# Patient Record
Sex: MALE | Race: WHITE | Employment: OTHER | URBAN - METROPOLITAN AREA
[De-identification: names, ages, dates, MRNs, and addresses within clinical notes are randomized per-mention and may not be internally consistent; named-entity substitution may affect disease eponyms.]

---

## 2020-10-28 ENCOUNTER — TRANSCRIBE ORDER (OUTPATIENT)
Dept: SCHEDULING | Age: 85
End: 2020-10-28

## 2020-10-28 DIAGNOSIS — I82.503 CHRONIC VENOUS EMBOLISM AND THROMBOSIS OF DEEP VESSELS OF LOWER EXTREMITY, BILATERAL (HCC): Primary | ICD-10-CM

## 2020-11-02 ENCOUNTER — TRANSCRIBE ORDER (OUTPATIENT)
Dept: SCHEDULING | Age: 85
End: 2020-11-02

## 2020-12-02 ENCOUNTER — TRANSCRIBE ORDER (OUTPATIENT)
Dept: SCHEDULING | Age: 85
End: 2020-12-02

## 2020-12-02 DIAGNOSIS — R26.0 STAGGERING GAIT: Primary | ICD-10-CM

## 2020-12-02 DIAGNOSIS — R26.9 IMPAIRED GAIT: Primary | ICD-10-CM

## 2021-04-15 ENCOUNTER — HOSPITAL ENCOUNTER (OUTPATIENT)
Dept: CT IMAGING | Age: 86
Discharge: HOME | End: 2021-04-15
Attending: UROLOGY
Payer: MEDICARE

## 2021-04-15 DIAGNOSIS — C67.9 BLADDER CANCER (HCC): ICD-10-CM

## 2021-04-15 PROCEDURE — 74011000250 HC RX REV CODE- 250

## 2021-04-15 PROCEDURE — 74178 CT ABD&PLV WO CNTR FLWD CNTR: CPT

## 2021-04-15 PROCEDURE — 74011000636 HC RX REV CODE- 636

## 2021-04-15 RX ORDER — BARIUM SULFATE 20 MG/ML
450 SUSPENSION ORAL
Status: COMPLETED | OUTPATIENT
Start: 2021-04-15 | End: 2021-04-15

## 2021-04-15 RX ORDER — BARIUM SULFATE 20 MG/ML
450 SUSPENSION ORAL
Status: DISCONTINUED | OUTPATIENT
Start: 2021-04-15 | End: 2021-04-15

## 2021-04-15 RX ADMIN — IOPAMIDOL 100 ML: 612 INJECTION, SOLUTION INTRAVENOUS at 10:08

## 2021-04-15 RX ADMIN — BARIUM SULFATE 450 ML: 20 SUSPENSION ORAL at 10:08

## 2022-09-08 ENCOUNTER — HOSPITAL ENCOUNTER (OUTPATIENT)
Dept: PHYSICAL THERAPY | Age: 87
Discharge: HOME | End: 2022-09-08
Payer: MEDICARE

## 2022-09-08 PROCEDURE — 97161 PT EVAL LOW COMPLEX 20 MIN: CPT

## 2022-09-08 PROCEDURE — 97140 MANUAL THERAPY 1/> REGIONS: CPT

## 2022-09-08 PROCEDURE — 97110 THERAPEUTIC EXERCISES: CPT

## 2022-09-08 NOTE — PROGRESS NOTES
[facility] Outpatient Rehabilitation  [address], [facility], [address] [facility]:  [phone]  FAX: [phone]    Plan of Care/Statement of Necessity for Physical Therapy Services  2-15    Patient name: Love Bella  : 1935  Provider#: [id number]  Referral source: Maddie Rockwell *      Medical/Treatment Diagnosis: Jaw pain [R68.84]     Prior Hospitalization: see medical history     Comorbidities: HTN  Prior Level of Function: ambulates with cane, drives, active, travels  Medications: Verified on Patient Summary List  Start of Care: 22      Onset Date: 2 weeks ago   The Plan of Care and following information is based on the information from the initial evaluation. Assessment/ key information: Richie Palacios presents with R clicking of the TMJ, insidious onset. No c/o pain with palpation. Able to diminish/ashley the R clicking with manual pressure of the R TMJ. The clicking of the R TMJ may be due to the articular disc moving anterior to the condylar head and then moving back in the proper postion when he closes her mouth. Recommend patient also have a consult with a dentist.  Patient will benefit from TMJ exercises to increase stability and movement of the R TMJ.       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 Patient will report the TMJ clicking has decreased by 50%. Patient will report he is able to diminish the TMJ clicking by manually stabelizing the R TMJ. Long Term Goals: To be accomplished in 16 treatments:   Patient will report he is able to eat without TMJ clicking. Frequency / Duration: Patient to be seen 1-2  times per week for 16 treatments. Patient/ Caregiver education and instruction: exercises    [x]  Plan of care has been reviewed with PTA    The severity rating is based on clinical judgment and the FOTO Score score. Certification Period: 9/8/22-12/8/22  Diane Stauffer, PT 9/8/2022   ________________________________________________________________________    I certify that the above Therapy Services are being furnished while the patient is under my care. I agree with the treatment plan and certify that this therapy is necessary. [de-identified] Signature:____________________  Date:____________Time: _________           Soledad Diss *    Please sign and return to: [facility] Outpatient Rehabilitation  [address], [place], [address] [facility]:  [address] [place] [address] Street: [phone]

## 2022-09-08 NOTE — PROGRESS NOTES
PT INITIAL EVALUATION NOTE - [facility] 2-15    Patient Name: Gabbie Larkin  Date:2022  : 1935  [x]  Patient  Verified  Payor: [employer]ren[employer] / Plan: [place] / Product Type: Medicare /    In time:1045  Out time:1130  Total Treatment Time (min): 45  Total Timed Codes (min): 45  1:1 Treatment Time ( [address] only): 45   Visit #: 1     Treatment Area: Jaw pain [R68.84]    SUBJECTIVE  Pain Level (0-10 scale): 0  Any medication changes, allergies to medications, adverse drug reactions, diagnosis change, or new procedure performed?: [] No    [x] Yes (see summary sheet for update)  Subjective:    Patient reports insidious onset of TMJ clicking, clicking while eating, reports annoying while eating,clicks every bite  Pt Goals: stop the TMJ clicking        OBJECTIVE/EXAMINATION  Posture:  forward head posture  Other Observations:    Gait and Functional Mobility:  pt ambulates independently with a straight cane  Palpation: no tenderness, audible and able to palpate click of R TMJ when opening the mouth full and returning to closed position. Bottom jaw deviation to R- mild click. Bottom jaw deviation to L- moderate click.   Cervical AROM WFL, does not reproduce clicking  TMJ AROM WFL- audible click with full opening of jaw, when jaw opens 1/2 way, no clicking noted  MMT: [facility]  Neurological: Reflexes / Sensations: intact      10 min Therapeutic Exercise:  [] See flow sheet :instructed in upright sitting and standing posture, scapular retraction, being aware of upright posture when chewing/eating   Rationale:  decrease clicking  to improve the patients ability to eat        15 min Manual Therapy: MFR occipitals, SCM, TMJ palpation and stabelization with opening and closing the mouth, able to ashley the click on R TMJ with manual stabelization    Rationale:  decrease clicking  to improve the patients ability to eat              With   [] TE   [] TA   [] neuro   [] other: Patient Education: [x] Review HEP    [] Progressed/Changed HEP based on:   [] positioning   [] body mechanics   [] transfers   [] heat/ice application    [] other:        Pain Level (0-10 scale) post treatment: 0    ASSESSMENT/Changes in Function:     [x]  See Plan of [address], PT 9/8/2022

## 2022-09-14 ENCOUNTER — HOSPITAL ENCOUNTER (OUTPATIENT)
Dept: PHYSICAL THERAPY | Age: 87
Discharge: HOME | End: 2022-09-14
Payer: MEDICARE

## 2022-09-14 PROCEDURE — 97110 THERAPEUTIC EXERCISES: CPT

## 2022-09-14 PROCEDURE — 97140 MANUAL THERAPY 1/> REGIONS: CPT

## 2022-09-14 NOTE — PROGRESS NOTES
PT DAILY TREATMENT NOTE - [facility]     Patient Name: Yosef Redmond  Date:2022  : 1935  [x]  Patient  Verified  Payor: Dee Rios / Plan: [place] MEDICARE PART A & B / Product Type: Medicare /    In time:2  Out time:245  Total Treatment Time (min): 45  Total Timed Codes (min): 45  1:1 Treatment Time ([facility] only): 39   Visit #: 2 of 16    Treatment Area: Jaw pain [R68.84]    SUBJECTIVE  Pain Level (0-10 scale): 0  Any medication changes, allergies to medications, adverse drug reactions, diagnosis change, or new procedure performed?: [x] No    [] Yes (see summary sheet for update)  Subjective functional status/changes:   [] No changes reported  Jaw was clicking less after the last visit    OBJECTIVE    15 min Therapeutic Exercise:  [x] See flow sheet :Patient instructed in and performed seated TMJ opening with unilateral facilitation of the finger to diminish the clicking/ popping of R TMJ, patient also performed  and educated in TMJ disc recapture exercise   Rationale:  decrease TMJ clicking and popping  to improve the patients ability to eat       30 min Manual Therapy:  MFR, occipital release , SCM, cervical paraspinals, upper traps, gentle MFR over mandible, gentle overpressure R TMJ when opening and closing mouth   Rationale:  decrease clicking   to improve ability of patient to eat       With   [] TE   [] TA   [] neuro   [] other: Patient Education: [x] Review HEP    [] Progressed/Changed HEP based on:   [] positioning   [] body mechanics   [] transfers   [] heat/ice application    [] other:         Pain Level (0-10 scale) post treatment: 0    ASSESSMENT/Changes in Function: clicking diminished after treatment and patient has been instructed in TMJ stabelization technique which abated the clicking during therapy    Patient will continue to benefit from skilled PT services to modify and progress therapeutic interventions and address functional mobility deficits to attain remaining goals.      [x] See Plan of Care  []  See progress note/recertification  []  See Discharge Summary         Progress towards goals / Updated goals:  Progressing towards goals    PLAN  []  Upgrade activities as tolerated     []  Continue plan of care  []  Update interventions per flow sheet       []  Discharge due to:_  [x]  Other:_ Patient will be going out of town and will contact PT if he needs to return.      Olena Litten, PT 9/14/2022

## 2022-10-20 NOTE — PROGRESS NOTES
PhysicalTherapy Discharge Summary    Patient name: Davina Tapia Start of Care: 2022   Referral source: Paty Treadwell * : 1935   Medical/Treatment Diagnosis: Jaw pain [R68.84] Onset Date:refer to eval     Prior Hospitalization: see medical history Provider#: Medications: Verified on Patient Summary List    Comorbidities: refer to eval  Prior Level of Function:refer to eval  Visits from Start of Care: 2    Missed Visits: 0  Reporting Period : 2022 to 2022    Summary of [facility] reported feeling better after the second treatment and reported he did not need to come in anymore. He reported less jaw clicking. Unknown if all goals met. Patient went out of town and has not contacted PT to return. Patient progressing towards goals  Short Term Goals: To be accomplished in 8 weeks:   Patient will be independent in HEP. Patient will report the TMJ clicking has decreased by 50%. Patient will report he is able to diminish the TMJ clicking by manually stabelizing the R TMJ. Long Term Goals: To be accomplished in 16 treatments:   Patient will report he is able to eat without TMJ clicking. The severity rating is based on clinical judgment and the FOTO score.     ASSESSMENT/RECOMMENDATIONS:  [x]Discontinue therapy: [x]Patient has reached or is progressing toward set goals      []Patient is non-compliant or has abdicated      []Due to lack of appreciable progress towards set [facility], PT 10/20/2022

## 2023-01-23 ENCOUNTER — HOSPITAL ENCOUNTER (OUTPATIENT)
Dept: PHYSICAL THERAPY | Age: 88
Discharge: HOME | End: 2023-01-23
Payer: MEDICARE

## 2023-01-23 PROCEDURE — 97161 PT EVAL LOW COMPLEX 20 MIN: CPT

## 2023-01-23 PROCEDURE — 97035 APP MDLTY 1+ULTRASOUND EA 15: CPT

## 2023-01-23 PROCEDURE — 97110 THERAPEUTIC EXERCISES: CPT

## 2023-01-23 PROCEDURE — 97140 MANUAL THERAPY 1/> REGIONS: CPT

## 2023-01-23 NOTE — PROGRESS NOTES
[facility] Outpatient Rehabilitation  [address], [place], [address] [facility]:  [phone]  FAX: [phone]    Plan of Care/Statement of Necessity for Physical Therapy Services  2-15    Patient name: Yamilet Ford  : 1935  Provider#: [id number]  Referral source: Ganga Mcghee *      Medical/Treatment Diagnosis: Pain in right hip [M25.551]     Prior Hospitalization: see medical history     Comorbidities: HTN  Prior Level of Function: ambulates with a straight cane or a rollator  Medications: Verified on Patient Summary List  Start of Care: 2023      Onset Date: months   The Plan of Care and following information is based on the information from the initial evaluation. Assessment/ key information: Alie Lamb presents with intermittent pain R piriformis region and will benefit from LE flexibility exercises, LE and trunk strengthening, MFR, postural exercises and modalities as indicated.     Evaluation Complexity History MEDIUM  Complexity : 1-2 comorbidities / personal factors will impact the outcome/ POC ; Examination MEDIUM Complexity : 3 Standardized tests and measures addressing body structure, function, activity limitation and / or participation in recreation  ;Presentation LOW Complexity : Stable, uncomplicated  ;Clinical Decision Making MEDIUM Complexity : FOTO score of 26-74  Overall Complexity Rating: LOW     Problem List: pain affecting function, decrease ROM, and decrease strength   Treatment Plan may include any combination of the following: Therapeutic exercise, Neuromuscular reeducation, Manual therapy, Therapeutic activity, Electric stim unattended , and Gait training ultrasound  Patient / Family readiness to learn indicated by: asking questions, trying to perform skills, and interest  Persons(s) to be included in education: patient (P)  Barriers to Learning/Limitations: None  Patient Goal (s): to stop the pain, continue to travel, continue to exercise with  2x/week  Patient Self Reported Health Status: good  Rehabilitation Potential: good    Short Term Goals: To be accomplished in 8 treatments:   Patient will be independent in a HEP to manage his symptoms. Patient will report his worst pain with activities has decreased to a 3/10. Patient will report he is able to perform bed mobility and sit to stand transfers without pain. Long Term Goals: To be accomplished in 16 treatments:   Patient will report he is able to resume all activities without difficulty and without pain. Frequency / Duration: Patient to be seen 1-2 times per week for 16 treatments. Patient/ Caregiver education and instruction: exercises    [x]  Plan of care has been reviewed with PTA    The severity rating is based on clinical judgment and the FOTO Score score. Certification Period: 1/23/2023-4/23/2023  Marlon Martinez, PT 1/23/2023   ________________________________________________________________________    I certify that the above Therapy Services are being furnished while the patient is under my care. I agree with the treatment plan and certify that this therapy is necessary. [de-identified] Signature:____________________  Date:____________Time: _________           Eric Jacobsen *    Please sign and return to: [facility] Outpatient Rehabilitation  [address], [place], [address] [facility]:  [address] [place] [address] Street: [phone]

## 2023-01-23 NOTE — PROGRESS NOTES
PT INITIAL EVALUATION NOTE - [facility] 2-15    Patient Name: Preeti Singh  Date:2023  : 1935  [x]  Patient  Verified  Payor: Jess Zain / Plan: [place] MEDICARE PART A & B / Product Type: Medicare /    In time:2  Out time:3  Total Treatment Time (min): 60  Total Timed Codes (min): 60  1:1 Treatment Time ([facility] only): 60   Visit #: 1     Treatment Area: Pain in right hip [M25.551]    SUBJECTIVE  Pain Level (0-10 scale): 5  Any medication changes, allergies to medications, adverse drug reactions, diagnosis change, or new procedure performed?: [] No    [x] Yes (see summary sheet for update)  Subjective:    Pain R posterior hip, intermittent when standing or move a certain way.   Exercises with  2x/week does weights  PLOF: ambulates with a 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 Cane, knees flexed, unsteady at time  Palpation: moderate tenderness R piriformis  Lumbar AROM WFL, decreased flexibility of bilateral hamstrings and hip external rotators  MMT: LE MMT grossly 4/5  Neurological: Reflexes / Sensations: intact to light touch bilateral LEs      Modality rationale: decrease inflammation, decrease pain, and increase tissue extensibility to improve the patients ability to perform functional activities   Min Type Additional Details    [] Estim: []Att   []Unatt        []TENS instruct                  []IFC  []Premod   []NMES                     []Other:  []w/US   []w/ice   []w/heat  Position:  Location:    []  Traction: [] Cervical       []Lumbar                       [] Prone          []Supine                       []Intermittent   []Continuous Lbs:  [] before manual  [] after manual  []w/heat   8 [x]  Ultrasound: []Continuous   [x] Pulsed at:                           [x]1MHz   []3MHz Location:R posterior hip, piriformis  W/cm2:1.2    [] Paraffin         Location:   []w/heat    []  Ice     []  Heat  []  Ice massage Position:  Location:    []  Laser  []  Other: Position:  Location:      []  Vasopneumatic Device Pressure:       [] lo [] med [] hi   Temperature:      [x] Skin assessment post-treatment:  [x]intact []redness- no adverse reaction    []redness - adverse reaction:     10 min Therapeutic Exercise:  [x] See flow sheet :   Rationale: increase ROM and increase strength to improve the patients ability to perform functional activities    8 min Manual Therapy: MFR piriformis/lumbar /sacral, trigger point release, patient in L sidelying    Rationale: decrease pain, increase ROM, increase tissue extensibility, and decrease trigger points to improve the patients ability to perform activities without pain              With   [] TE   [] TA   [] neuro   [] other: Patient Education: [x] Review HEP    [] Progressed/Changed HEP based on:   [] positioning   [] body mechanics   [] transfers   [] heat/ice application    [] other:      Other Objective/Functional Measures:FOTO    Pain Level (0-10 scale) post treatment: \"feels much better\"    ASSESSMENT/Changes in Function:     [x]  See Plan of [address], PT 1/23/2023

## 2023-01-25 ENCOUNTER — HOSPITAL ENCOUNTER (OUTPATIENT)
Dept: PHYSICAL THERAPY | Age: 88
Discharge: HOME | End: 2023-01-25
Payer: MEDICARE

## 2023-01-25 PROCEDURE — 97035 APP MDLTY 1+ULTRASOUND EA 15: CPT

## 2023-01-25 PROCEDURE — 97110 THERAPEUTIC EXERCISES: CPT

## 2023-01-25 PROCEDURE — 97140 MANUAL THERAPY 1/> REGIONS: CPT

## 2023-01-25 NOTE — PROGRESS NOTES
PT DAILY TREATMENT NOTE - [facility]     Patient Name: Preeti Singh  Date:2023  : 1935  [x]  Patient  Verified  Payor: [place] MEDICARE / Plan: [place] MEDICARE PART A & B / Product Type: Medicare /    In time:245  Out time:325  Total Treatment Time (min): 40  Total Timed Codes (min): 40  1:1 Treatment Time ([facility] only): 40   Visit #: 2 of 16    Treatment Area: Pain in right hip [M25.551]    SUBJECTIVE  Pain Level (0-10 scale): intermittent 5  Any medication changes, allergies to medications, adverse drug reactions, diagnosis change, or new procedure performed?: [x] No    [] Yes (see summary sheet for update)  Subjective functional status/changes:   [] No changes reported  Patient is sore today, exercised yesterday    OBJECTIVE            Modality rationale: decrease inflammation, decrease pain, and increase tissue extensibility to improve the patients ability to perform functional activities   Min Type Additional Details      [] Estim: []Att   []Unatt        []TENS instruct                  []IFC  []Premod   []NMES                     []Other:  []w/US   []w/ice   []w/heat  Position:  Location:      []  Traction: [] Cervical       []Lumbar                       [] Prone          []Supine                       []Intermittent   []Continuous Lbs:  [] before manual  [] after manual  []w/heat    8 [x]  Ultrasound: []Continuous   [x] Pulsed at:                           [x]1MHz   []3MHz Location:R posterior hip, piriformis  W/cm2:1.2      [] Paraffin         Location:   []w/heat      []  Ice     []  Heat  []  Ice massage Position:  Location:      []  Laser  []  Other: Position:  Location:         []  Vasopneumatic Device Pressure:       [] lo [] med [] hi   Temperature:        [x] Skin assessment post-treatment:  [x]intact []redness- no adverse reaction    []redness - adverse reaction:      20 min Therapeutic Exercise:  [x] See flow sheet :   Rationale: increase ROM and increase strength to improve the patients ability to perform functional activities     10 min Manual Therapy: MFR piriformis/lumbar /sacral, trigger point release, patient in L sidelying    Rationale: decrease pain, increase ROM, increase tissue extensibility, and decrease trigger points to improve the patients ability to perform activities without pain       With   [] TE   [] TA   [] neuro   [] other: Patient Education: [x] Review HEP    [] Progressed/Changed HEP based on:   [] positioning   [] body mechanics   [] transfers   [] heat/ice application    [] other:           Pain Level (0-10 scale) post treatment: 0-5    ASSESSMENT/Changes in Function: Patient continues to progress towards goals, decreased pain after therapy    Patient will continue to benefit from skilled PT services to modify and progress therapeutic interventions and address functional mobility deficits to attain remaining goals.      [x]  See Plan of Care  []  See progress note/recertification  []  See Discharge Summary         Progress towards goals / Updated goals:  Progressing towards goals    PLAN  [x]  Upgrade activities as tolerated     [x]  Continue plan of care  [x]  Update interventions per flow sheet       []  Discharge due to:_  []  Other:_      Gregory Khan, PT 1/25/2023

## 2023-02-01 ENCOUNTER — HOSPITAL ENCOUNTER (OUTPATIENT)
Dept: PHYSICAL THERAPY | Age: 88
Discharge: HOME | End: 2023-02-01
Payer: MEDICARE

## 2023-02-01 PROCEDURE — 97110 THERAPEUTIC EXERCISES: CPT

## 2023-02-01 PROCEDURE — 97035 APP MDLTY 1+ULTRASOUND EA 15: CPT

## 2023-02-01 PROCEDURE — 97140 MANUAL THERAPY 1/> REGIONS: CPT

## 2023-02-01 NOTE — PROGRESS NOTES
PT DAILY TREATMENT NOTE - [facility]     Patient Name: Valery Navarro  Date:2023  : 1935  [x]  Patient  Verified  Payor: John Morrison / Plan: [place] MEDICARE PART A & B / Product Type: Medicare /    In time:210  Out time:3  Total Treatment Time (min): 40  Total Timed Codes (min): 40  1:1 Treatment Time ( [address] only): 40   Visit #: 3 of 16    Treatment Area: Pain in right hip [M25.551]    SUBJECTIVE  Pain Level (0-10 scale): intermittent ache  Any medication changes, allergies to medications, adverse drug reactions, diagnosis change, or new procedure performed?: [x] No    [] Yes (see summary sheet for update)  Subjective functional status/changes:   [] No changes reported  Patient thinks his pain may be decreasing, he exercised with a  today    OBJECTIVE              Modality rationale: decrease inflammation, decrease pain, and increase tissue extensibility to improve the patients ability to perform functional activities   Min Type Additional Details       [] Estim: []Att   []Unatt        []TENS instruct                  []IFC  []Premod   []NMES                     []Other:  []w/US   []w/ice   []w/heat  Position:  Location:       []  Traction: [] Cervical       []Lumbar                       [] Prone          []Supine                       []Intermittent   []Continuous Lbs:  [] before manual  [] after manual  []w/heat     8 [x]  Ultrasound: []Continuous   [x] Pulsed at:                           [x]1MHz   []3MHz Location:R posterior hip, piriformis  W/cm2:1.2       [] Paraffin         Location:   []w/heat       []  Ice     []  Heat  []  Ice massage Position:  Location:       []  Laser  []  Other: Position:  Location:          []  Vasopneumatic Device Pressure:       [] lo [] med [] hi   Temperature:         [x] Skin assessment post-treatment:  [x]intact []redness- no adverse reaction    []redness - adverse reaction:      15 min Therapeutic Exercise:  [x] See flow sheet :   Rationale: increase ROM and increase strength to improve the patients ability to perform functional activities     15 min Manual Therapy: MFR piriformis/lumbar /sacral, trigger point release, patient in L sidelying    Rationale: decrease pain, increase ROM, increase tissue extensibility, and decrease trigger points to improve the patients ability to perform activities without pain  With   [] TE   [] TA   [] neuro   [] other: Patient Education: [x] Review HEP    [] Progressed/Changed HEP based on:   [] positioning   [] body mechanics   [] transfers   [] heat/ice application    [] other:         Pain Level (0-10 scale) post treatment: \"better\"    ASSESSMENT/Changes in Function: pain is decreasing, patient is working towards goals    Patient will continue to benefit from skilled PT services to modify and progress therapeutic interventions and address functional mobility deficits to attain remaining goals.      [x]  See Plan of Care  []  See progress note/recertification  []  See Discharge Summary             PLAN  []  Upgrade activities as tolerated     [x]  Continue plan of care  []  Update interventions per flow sheet       []  Discharge due to:_  []  Other:_      Leyla Londono, PT 2/1/2023

## 2023-02-03 ENCOUNTER — HOSPITAL ENCOUNTER (OUTPATIENT)
Dept: ULTRASOUND IMAGING | Age: 88
Discharge: HOME | End: 2023-02-03
Attending: INTERNAL MEDICINE
Payer: MEDICARE

## 2023-02-03 ENCOUNTER — TRANSCRIBE ORDER (OUTPATIENT)
Dept: SCHEDULING | Age: 88
End: 2023-02-03

## 2023-02-03 DIAGNOSIS — M79.661 BILATERAL CALF PAIN: ICD-10-CM

## 2023-02-03 DIAGNOSIS — M79.661 BILATERAL CALF PAIN: Primary | ICD-10-CM

## 2023-02-03 DIAGNOSIS — M79.662 BILATERAL CALF PAIN: ICD-10-CM

## 2023-02-03 DIAGNOSIS — M79.662 BILATERAL CALF PAIN: Primary | ICD-10-CM

## 2023-02-03 PROCEDURE — 93971 EXTREMITY STUDY: CPT

## 2023-02-06 ENCOUNTER — HOSPITAL ENCOUNTER (OUTPATIENT)
Dept: PHYSICAL THERAPY | Age: 88
Discharge: HOME | End: 2023-02-06
Payer: MEDICARE

## 2023-02-06 PROCEDURE — 97140 MANUAL THERAPY 1/> REGIONS: CPT

## 2023-02-06 PROCEDURE — 97110 THERAPEUTIC EXERCISES: CPT

## 2023-02-06 PROCEDURE — 97035 APP MDLTY 1+ULTRASOUND EA 15: CPT

## 2023-02-06 NOTE — PROGRESS NOTES
PhysicalTherapy Discharge Summary    Patient name: Yenifer Camacho Start of Care: 2023   Referral source: Anahi Krishnamurthy * : 1935   Medical/Treatment Diagnosis: Pain in right hip [M25.551] Onset Date:refer to eval     Prior Hospitalization: see medical history Provider#: Medications: Verified on Patient Summary List    Comorbidities: refer to eval  Prior Level of Function:refer to eval  Visits from Start of Care: 4     Reporting Period : 2023 to 2023    Summary of Care: Patient reports he is feeling much better and feels ready to stop Physical therapy. He has been instructed in a HEP and meets with a  2x/week to exercise. Goals met. Short Term Goals: To be accomplished in 8 treatments:MET   Patient will be independent in a HEP to manage his symptoms. Patient will report his worst pain with activities has decreased to a 3/10. Patient will report he is able to perform bed mobility and sit to stand transfers without pain. Long Term Goals: To be accomplished in 16 treatments:MET   Patient will report he is able to resume all activities without difficulty and without pain. The severity rating is based on clinical judgment and the FOTO score.     ASSESSMENT/RECOMMENDATIONS:  [x]Discontinue therapy: [x]Patient has reached or is progressing toward set goals      []Patient is non-compliant or has abdicated      []Due to lack of appreciable progress towards set goals    Anita Hodges, PT 2023

## 2023-02-06 NOTE — PROGRESS NOTES
PT DAILY TREATMENT NOTE - [facility]     Patient Name: Lina Velázquez  Date:2023  : 1935  [x]  Patient  Verified  Payor: [place] MED[place]ARE / Plan: [place] MEDICARE PART A & B / Product Type: Medicare /    In time:10  Out time:1040  Total Treatment Time (min): 40  Total Timed Codes (min): 40  1:1 Treatment Time ( only): 40   Visit #: 4 of 16    Treatment Area: Pain in right hip [M25.551]    SUBJECTIVE  Pain Level (0-10 scale): 0  Any medication changes, allergies to medications, adverse drug reactions, diagnosis change, or new procedure performed?: [x] No    [] Yes (see summary sheet for update)  Subjective functional status/changes:   [] No changes reported  Patient reports he is feeling much better and today can be his last visit    OBJECTIVE                 Modality rationale: decrease inflammation, decrease pain, and increase tissue extensibility to improve the patients ability to perform functional activities   Min Type Additional Details       [] Estim: []Att   []Unatt        []TENS instruct                  []IFC  []Premod   []NMES                     []Other:  []w/US   []w/ice   []w/heat  Position:  Location:       []  Traction: [] Cervical       []Lumbar                       [] Prone          []Supine                       []Intermittent   []Continuous Lbs:  [] before manual  [] after manual  []w/heat     8 [x]  Ultrasound: []Continuous   [x] Pulsed at:                           [x]1MHz   []3MHz Location:R posterior hip, piriformis  W/cm2:1.2       [] Paraffin         Location:   []w/heat       []  Ice     []  Heat  []  Ice massage Position:  Location:       []  Laser  []  Other: Position:  Location:          []  Vasopneumatic Device Pressure:       [] lo [] med [] hi   Temperature:         [x] Skin assessment post-treatment:  [x]intact []redness- no adverse reaction    []redness - adverse reaction:      15 min Therapeutic Exercise:  [x] See flow sheet :review HEP, exercises, positioning Rationale: increase ROM and increase strength to improve the patients ability to perform functional activities     15 min Manual Therapy: MFR piriformis/lumbar /sacral, trigger point release, patient in L sidelying    Rationale: decrease pain, increase ROM, increase tissue extensibility, and decrease trigger points to improve the patients ability to perform activities without pain              With   [] TE   [] TA   [] neuro   [] other: Patient Education: [x] Review HEP    [] Progressed/Changed HEP based on:   [] positioning   [] body mechanics   [] transfers   [] heat/ice application    [] other:         Pain Level (0-10 scale) post treatment: 0    ASSESSMENT/Changes in Function: goals met, D/C PT    []  See Plan of Care  []  See progress note/recertification  [x]  See Discharge Summary             PLAN  []  Upgrade activities as tolerated     []  Continue plan of care  []  Update interventions per flow sheet       [x]  Discharge due to:_Goals met  []  Other:_      Evelia Hennessy, PT 2/6/2023

## 2023-03-28 ENCOUNTER — HOSPITAL ENCOUNTER (OUTPATIENT)
Dept: LAB | Age: 88
Discharge: HOME | End: 2023-03-28
Payer: MEDICARE

## 2023-03-28 LAB
APPEARANCE UR: CLEAR
BILIRUB UR QL: NEGATIVE
COLOR UR: ABNORMAL
GLUCOSE UR STRIP.AUTO-MCNC: NEGATIVE MG/DL
HGB UR QL STRIP: NEGATIVE
KETONES UR QL STRIP.AUTO: ABNORMAL MG/DL
LEUKOCYTE ESTERASE UR QL STRIP.AUTO: NEGATIVE
NITRITE UR QL STRIP.AUTO: NEGATIVE
PH UR STRIP: 6 (ref 5–8)
PROT UR STRIP-MCNC: NEGATIVE MG/DL
SP GR UR REFRACTOMETRY: 1.02 (ref 1–1.03)
UROBILINOGEN UR QL STRIP.AUTO: 1 EU/DL (ref 0.2–1)

## 2023-03-28 PROCEDURE — 87086 URINE CULTURE/COLONY COUNT: CPT

## 2023-03-28 PROCEDURE — 81003 URINALYSIS AUTO W/O SCOPE: CPT

## 2023-03-29 LAB
BACTERIA SPEC CULT: NORMAL
SERVICE CMNT-IMP: NORMAL

## 2023-04-19 ENCOUNTER — HOSPITAL ENCOUNTER (OUTPATIENT)
Dept: PHYSICAL THERAPY | Age: 88
Discharge: HOME | End: 2023-04-19
Payer: MEDICARE

## 2023-04-19 PROCEDURE — 97110 THERAPEUTIC EXERCISES: CPT

## 2023-04-19 PROCEDURE — 97140 MANUAL THERAPY 1/> REGIONS: CPT

## 2023-04-19 NOTE — PROGRESS NOTES
PT DAILY TREATMENT NOTE - [facility]     Patient Name: Susan Ly  Date:2023  : 1935  [x]  Patient  Verified  Payor: Ric Weller / Plan: [place] MEDICARE PART A & B / Product Type: Medicare /    In time:1130  Out time:1205  Total Treatment Time (min): 35  Total Timed Codes (min): 35  1:1 Treatment Time ( [address] only): 35   Visit #: 2 of 16    Treatment Area: Other abnormalities of gait and mobility [R26.89]    SUBJECTIVE  Pain Level (0-10 scale): 0  Any medication changes, allergies to medications, adverse drug reactions, diagnosis change, or new procedure performed?: [x] No    [] Yes (see summary sheet for update)  Subjective functional status/changes:   [] No changes reported  Patient reports his neck is feeling a lot better and he does not have to return for balance exercises after today because he exercises with a  2x/week at [employer]. OBJECTIVE      20 min Therapeutic Exercise:  [x] See flow sheet :   Rationale: increase strength and improve balance to improve the patients ability to perform functional activities      10 min Manual Therapy:  MFR R cervical, upper trap   Rationale: decrease pain to improve functional mobility              With   [] TE   [] TA   [] neuro   [] other: Patient Education: [x] Review HEP    [] Progressed/Changed HEP based on:   [] positioning   [] body mechanics   [] transfers   [] heat/ice application    [] other:           Pain Level (0-10 scale) post treatment: 0    ASSESSMENT/Changes in Function: This is Marcio's 2nd visit and goals not achieved due to time frame. However, he does not think he needs to continues therapy because he exercises with a  2 days/week. The benefits of balance training was explained to the patient.       []  See Plan of Care  []  See progress note/recertification  [x]  See Discharge Summary             PLAN  []  Upgrade activities as tolerated     []  Continue plan of care  []  Update interventions per flow sheet       [x] Discharge due to:_patient request  []  Other:_      Mitchell Hernandez, PT 4/19/2023

## 2023-04-20 NOTE — PROGRESS NOTES
PhysicalTherapy Discharge Summary    Patient name: Robin Bernard Start of Care: 2023   Referral source: Sandy Stein MD : 1935   Medical/Treatment Diagnosis: Other abnormalities of gait and mobility [R26.89] Onset Date:refer to eval     Prior Hospitalization: see medical history Provider#: Medications: Verified on Patient Summary List    Comorbidities: refer to eval  Prior Level of Function:refer to eval  Visits from Start of Care: 2     Reporting Period : 2023 to 2023    Summary of Care: Patient reports that his neck feels better. He states that today can be his last visit because he works out with a  2x/week. The benefits of balance training was explained to the patient. Goals not met due to timeframe of visits. Short Term Goals: To be accomplished in 8 treatments:   Patient will be independent in a HEP to increase his safety with mobility. Mcgarry balance score will increase to a 18/56 to indicate improved balance with mobility. .  Patient will report that he is able to perform his daily activities without cervical pain. Long Term Goals: To be accomplished in 16 treatments:   Patient will increase Mcgarry balance score to a 24/56 to indicate improved balance  with mobility. Patient will report he has resumed all activities without cervical pain or challenges with cervical mobility. The severity rating is based on clinical judgment and the FOTO score.     ASSESSMENT/RECOMMENDATIONS:  [x]Discontinue therapy: []Patient has reached or is progressing toward set goals      [x]Patient request      []Due to lack of appreciable progress towards set goals    Matilde Wilson, PT 2023

## 2023-04-24 ENCOUNTER — APPOINTMENT (OUTPATIENT)
Dept: PHYSICAL THERAPY | Age: 88
End: 2023-04-24
Payer: MEDICARE

## 2023-05-01 ENCOUNTER — APPOINTMENT (OUTPATIENT)
Dept: PHYSICAL THERAPY | Age: 88
End: 2023-05-01

## 2023-05-16 ENCOUNTER — TRANSCRIBE ORDERS (OUTPATIENT)
Facility: HOSPITAL | Age: 88
End: 2023-05-16

## 2023-05-16 DIAGNOSIS — S70.02XA CONTUSION OF LEFT HIP, INITIAL ENCOUNTER: Primary | ICD-10-CM

## 2023-05-19 ENCOUNTER — HOSPITAL ENCOUNTER (OUTPATIENT)
Facility: HOSPITAL | Age: 88
Discharge: HOME | End: 2023-05-19
Payer: MEDICARE

## 2023-05-19 DIAGNOSIS — S70.02XA CONTUSION OF LEFT HIP, INITIAL ENCOUNTER: ICD-10-CM

## 2023-05-19 PROCEDURE — 76882 US LMTD JT/FCL EVL NVASC XTR: CPT

## 2023-07-16 ENCOUNTER — HOSPITAL ENCOUNTER (EMERGENCY)
Facility: HOSPITAL | Age: 88
Discharge: HOME | End: 2023-07-16
Attending: EMERGENCY MEDICINE
Payer: MEDICARE

## 2023-07-16 VITALS
BODY MASS INDEX: 25.62 KG/M2 | OXYGEN SATURATION: 97 % | TEMPERATURE: 97.7 F | HEIGHT: 71 IN | HEART RATE: 52 BPM | RESPIRATION RATE: 16 BRPM | SYSTOLIC BLOOD PRESSURE: 166 MMHG | WEIGHT: 183 LBS | DIASTOLIC BLOOD PRESSURE: 77 MMHG

## 2023-07-16 DIAGNOSIS — M79.604 RIGHT LEG PAIN: Primary | ICD-10-CM

## 2023-07-16 PROCEDURE — 99282 EMERGENCY DEPT VISIT SF MDM: CPT

## 2023-07-16 ASSESSMENT — LIFESTYLE VARIABLES
HOW MANY STANDARD DRINKS CONTAINING ALCOHOL DO YOU HAVE ON A TYPICAL DAY: PATIENT DOES NOT DRINK
HOW OFTEN DO YOU HAVE A DRINK CONTAINING ALCOHOL: NEVER
HOW OFTEN DO YOU HAVE A DRINK CONTAINING ALCOHOL: MONTHLY OR LESS

## 2023-07-16 ASSESSMENT — ENCOUNTER SYMPTOMS
SHORTNESS OF BREATH: 0
NAUSEA: 0
ABDOMINAL PAIN: 0
DIARRHEA: 0
SORE THROAT: 0
COUGH: 0
EYE REDNESS: 0
VOMITING: 0

## 2023-07-16 ASSESSMENT — PAIN - FUNCTIONAL ASSESSMENT
PAIN_FUNCTIONAL_ASSESSMENT: PREVENTS OR INTERFERES SOME ACTIVE ACTIVITIES AND ADLS
PAIN_FUNCTIONAL_ASSESSMENT: 0-10

## 2023-07-16 ASSESSMENT — PAIN SCALES - GENERAL: PAINLEVEL_OUTOF10: 6

## 2023-07-16 ASSESSMENT — PAIN DESCRIPTION - DESCRIPTORS: DESCRIPTORS: ACHING

## 2023-07-16 ASSESSMENT — PAIN DESCRIPTION - ORIENTATION: ORIENTATION: RIGHT

## 2023-07-17 ENCOUNTER — TRANSCRIBE ORDERS (OUTPATIENT)
Facility: HOSPITAL | Age: 88
End: 2023-07-17

## 2023-07-17 ENCOUNTER — HOSPITAL ENCOUNTER (OUTPATIENT)
Facility: HOSPITAL | Age: 88
Discharge: HOME | End: 2023-07-20
Attending: EMERGENCY MEDICINE
Payer: MEDICARE

## 2023-07-17 DIAGNOSIS — M79.661 RIGHT CALF PAIN: ICD-10-CM

## 2023-07-17 DIAGNOSIS — M79.661 RIGHT CALF PAIN: Primary | ICD-10-CM

## 2023-07-17 PROCEDURE — 93971 EXTREMITY STUDY: CPT

## 2023-07-20 ENCOUNTER — TRANSCRIBE ORDERS (OUTPATIENT)
Facility: HOSPITAL | Age: 88
End: 2023-07-20

## 2023-07-20 DIAGNOSIS — M54.16 LUMBAR RADICULOPATHY: Primary | ICD-10-CM

## 2023-07-24 ENCOUNTER — HOSPITAL ENCOUNTER (OUTPATIENT)
Facility: HOSPITAL | Age: 88
Discharge: HOME | End: 2023-07-27
Attending: INTERNAL MEDICINE
Payer: MEDICARE

## 2023-07-24 DIAGNOSIS — M54.16 LUMBAR RADICULOPATHY: ICD-10-CM

## 2023-07-24 PROCEDURE — 6360000004 HC RX CONTRAST MEDICATION: Performed by: INTERNAL MEDICINE

## 2023-07-24 PROCEDURE — 72158 MRI LUMBAR SPINE W/O & W/DYE: CPT

## 2023-07-24 PROCEDURE — A9579 GAD-BASE MR CONTRAST NOS,1ML: HCPCS | Performed by: INTERNAL MEDICINE

## 2023-07-24 RX ADMIN — GADOTERIDOL 18 ML: 279.3 INJECTION, SOLUTION INTRAVENOUS at 12:18

## 2023-07-27 ENCOUNTER — TRANSCRIBE ORDERS (OUTPATIENT)
Facility: HOSPITAL | Age: 88
End: 2023-07-27

## 2023-07-27 DIAGNOSIS — M54.10 RADICULOPATHY, UNSPECIFIED SPINAL REGION: Primary | ICD-10-CM

## 2023-07-31 ENCOUNTER — HOSPITAL ENCOUNTER (OUTPATIENT)
Facility: HOSPITAL | Age: 88
Discharge: HOME | End: 2023-08-03
Attending: INTERNAL MEDICINE
Payer: MEDICARE

## 2023-07-31 DIAGNOSIS — M54.10 RADICULOPATHY, UNSPECIFIED SPINAL REGION: ICD-10-CM

## 2023-07-31 PROCEDURE — 6360000004 HC RX CONTRAST MEDICATION: Performed by: INTERNAL MEDICINE

## 2023-07-31 PROCEDURE — 72156 MRI NECK SPINE W/O & W/DYE: CPT

## 2023-07-31 PROCEDURE — A9579 GAD-BASE MR CONTRAST NOS,1ML: HCPCS | Performed by: INTERNAL MEDICINE

## 2023-07-31 PROCEDURE — 72157 MRI CHEST SPINE W/O & W/DYE: CPT

## 2023-07-31 RX ADMIN — GADOTERIDOL 19 ML: 279.3 INJECTION, SOLUTION INTRAVENOUS at 15:43

## 2023-08-07 ENCOUNTER — TRANSCRIBE ORDERS (OUTPATIENT)
Facility: HOSPITAL | Age: 88
End: 2023-08-07

## 2023-08-07 ENCOUNTER — HOSPITAL ENCOUNTER (OUTPATIENT)
Facility: HOSPITAL | Age: 88
Discharge: HOME | End: 2023-08-10
Attending: INTERNAL MEDICINE
Payer: MEDICARE

## 2023-08-07 DIAGNOSIS — C67.9 MALIGNANT NEOPLASM OF URINARY BLADDER, UNSPECIFIED SITE (HCC): Primary | ICD-10-CM

## 2023-08-07 DIAGNOSIS — R74.8 ABNORMAL LEVELS OF OTHER SERUM ENZYMES: ICD-10-CM

## 2023-08-07 DIAGNOSIS — R74.8 ABNORMAL LEVELS OF OTHER SERUM ENZYMES: Primary | ICD-10-CM

## 2023-08-07 PROCEDURE — 76705 ECHO EXAM OF ABDOMEN: CPT

## 2023-08-08 ENCOUNTER — HOSPITAL ENCOUNTER (OUTPATIENT)
Facility: HOSPITAL | Age: 88
Discharge: HOME | End: 2023-08-11
Attending: INTERNAL MEDICINE
Payer: MEDICARE

## 2023-08-08 DIAGNOSIS — C67.9 MALIGNANT NEOPLASM OF URINARY BLADDER, UNSPECIFIED SITE (HCC): ICD-10-CM

## 2023-08-08 PROCEDURE — 71260 CT THORAX DX C+: CPT

## 2023-08-08 PROCEDURE — 6360000004 HC RX CONTRAST MEDICATION: Performed by: INTERNAL MEDICINE

## 2023-08-08 RX ADMIN — IOPAMIDOL 100 ML: 612 INJECTION, SOLUTION INTRAVENOUS at 11:51

## 2023-08-16 ENCOUNTER — HOSPITAL ENCOUNTER (OUTPATIENT)
Facility: HOSPITAL | Age: 88
Setting detail: RECURRING SERIES
Discharge: HOME | End: 2023-08-19
Payer: MEDICARE

## 2023-08-16 PROCEDURE — 97162 PT EVAL MOD COMPLEX 30 MIN: CPT

## 2023-08-16 PROCEDURE — 97110 THERAPEUTIC EXERCISES: CPT

## 2023-08-16 NOTE — PROGRESS NOTES
[facility]  [address], [address]   Office [phone]  Fax [phone]       PHYSICAL THERAPY - MEDICARE EVALUATION/PLAN OF CARE NOTE (updated 3/23)      Date: 2023          Patient Name:  Damion Wells :  1935   Medical   Diagnosis:  Pain in right hip [M25.551]  Right leg pain [M79.604] Treatment Diagnosis:  R hip and leg pain, weakness, decreased gait and balance   Referral Source:  Raven Fan,* Insurance:  Payor: MEDICARE / Plan: MEDICARE PART A AND B / Product Type: *No Product type* /             Patient  verified yes     Visit #   Current  / Total 1 16     SUBJECTIVE  Pain Level (0-10 scale): 2  []constant []intermittent []improving []worsening []no change since onset    Any medication changes, allergies to medications, adverse drug reactions, diagnosis change, or new procedure performed?: [x] No    [] Yes (see summary sheet for update)  Medications: Verified on Patient Summary List    Subjective functional status/changes:     Patient has had R hip and calf pain, severe, 10/10, unable to weight bear approx x 2 weeks, MRI, nodules on the cauda equina, speech is worse, weakness in voice  Patient has become weak, several months ago was driving, running errands and ambulating with a cane.   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

## 2023-08-21 ENCOUNTER — TRANSCRIBE ORDERS (OUTPATIENT)
Facility: HOSPITAL | Age: 88
End: 2023-08-21

## 2023-08-21 DIAGNOSIS — R26.89 OTHER ABNORMALITIES OF GAIT AND MOBILITY: Primary | ICD-10-CM

## 2023-08-22 ENCOUNTER — HOSPITAL ENCOUNTER (OUTPATIENT)
Facility: HOSPITAL | Age: 88
Discharge: HOME | End: 2023-08-25
Attending: INTERNAL MEDICINE
Payer: MEDICARE

## 2023-08-22 ENCOUNTER — APPOINTMENT (OUTPATIENT)
Facility: HOSPITAL | Age: 88
End: 2023-08-22
Payer: MEDICARE

## 2023-08-22 DIAGNOSIS — R26.89 OTHER ABNORMALITIES OF GAIT AND MOBILITY: ICD-10-CM

## 2023-08-22 PROCEDURE — 6360000004 HC RX CONTRAST MEDICATION: Performed by: INTERNAL MEDICINE

## 2023-08-22 PROCEDURE — A9579 GAD-BASE MR CONTRAST NOS,1ML: HCPCS | Performed by: INTERNAL MEDICINE

## 2023-08-22 PROCEDURE — 70553 MRI BRAIN STEM W/O & W/DYE: CPT

## 2023-08-22 RX ADMIN — GADOTERIDOL 20 ML: 279.3 INJECTION, SOLUTION INTRAVENOUS at 11:33

## 2023-08-24 ENCOUNTER — HOSPITAL ENCOUNTER (OUTPATIENT)
Facility: HOSPITAL | Age: 88
Setting detail: RECURRING SERIES
Discharge: HOME | End: 2023-08-27
Payer: MEDICARE

## 2023-08-24 PROCEDURE — 97116 GAIT TRAINING THERAPY: CPT

## 2023-08-24 PROCEDURE — 97110 THERAPEUTIC EXERCISES: CPT

## 2023-08-24 NOTE — PROGRESS NOTES
PHYSICAL THERAPY - MEDICARE DAILY TREATMENT NOTE (updated 3/23)      Date: 2023          Patient Name:  Suzanne Sabillon :  1935   Medical   Diagnosis:  Pain in right hip [M25.551]  Right leg pain [M79.604] Treatment Diagnosis:  M25.551  RIGHT HIP PAIN and M79.604  Pain in right leg    Referral Source:  Sohail Pinto,* Insurance:   Payor: MEDICARE / Plan: MEDICARE PART A AND B / Product Type: *No Product type* /                     Patient  verified yes     Visit #   Current  / Total 2 16   Time   In / Out 115 205   Total Treatment Time 50   Total Timed Codes 50   1:1 Treatment Time 48      [facility] Totals Reminder:  bill using total billable   min of TIMED therapeutic procedures and modalities. 8-22 min = 1 unit; 23-37 min = 2 units; 38-52 min = 3 units; 53-67 min = 4 units; 68-82 min = 5 units            SUBJECTIVE    Pain Level (0-10 scale): 4    Any medication changes, allergies to medications, adverse drug reactions, diagnosis change, or new procedure performed?: [x] No    [] Yes (see summary sheet for update)  Medications: Verified on Patient Summary List    Subjective functional status/changes:     I has been a bad day     OBJECTIVE      Therapeutic Procedures: Tx Min Billable or 1:1 Min (if diff from Tx Min) Procedure, Rationale, Specifics   35 [id number] Therapeutic Exercise (timed):  increase ROM, strength, coordination, balance, and proprioception to improve patient's ability to progress to PLOF and address remaining functional goals. (see flow sheet as applicable)     Details if applicable:     15 15 [id number] Gait Training (timed):    75 feet with Rolator (assistive device) over carpeted surfaces with CGA level of assist. Cuing for decreasing space of AD. To improve safety and dynamic movement with household/community ambulation.   (see flow sheet as applicable)     Details if applicable:     50 50    Total Total     [x]  Patient Education billed concurrently with other procedures

## 2023-09-11 ENCOUNTER — APPOINTMENT (OUTPATIENT)
Facility: HOSPITAL | Age: 88
End: 2023-09-11
Payer: MEDICARE

## 2023-09-11 ENCOUNTER — HOSPITAL ENCOUNTER (EMERGENCY)
Facility: HOSPITAL | Age: 88
Discharge: HOME | End: 2023-09-11
Attending: EMERGENCY MEDICINE
Payer: MEDICARE

## 2023-09-11 VITALS
HEIGHT: 71 IN | TEMPERATURE: 98.7 F | RESPIRATION RATE: 18 BRPM | BODY MASS INDEX: 24.92 KG/M2 | DIASTOLIC BLOOD PRESSURE: 78 MMHG | OXYGEN SATURATION: 95 % | WEIGHT: 178 LBS | HEART RATE: 61 BPM | SYSTOLIC BLOOD PRESSURE: 118 MMHG

## 2023-09-11 DIAGNOSIS — R50.9 FEVER, UNSPECIFIED FEVER CAUSE: ICD-10-CM

## 2023-09-11 DIAGNOSIS — R53.1 GENERAL WEAKNESS: Primary | ICD-10-CM

## 2023-09-11 LAB
ALBUMIN SERPL-MCNC: 2.7 G/DL (ref 3.5–5)
ALBUMIN/GLOB SERPL: 0.8 (ref 1.1–2.2)
ALP SERPL-CCNC: 126 U/L (ref 45–117)
ALT SERPL-CCNC: 34 U/L (ref 12–78)
ANION GAP SERPL CALC-SCNC: 11 MMOL/L (ref 5–15)
APPEARANCE UR: CLEAR
AST SERPL-CCNC: 21 U/L (ref 15–37)
BACTERIA URNS QL MICRO: NEGATIVE /HPF
BASOPHILS # BLD: 0 K/UL (ref 0–0.1)
BASOPHILS NFR BLD: 0 % (ref 0–1)
BILIRUB SERPL-MCNC: 0.5 MG/DL (ref 0.2–1)
BILIRUB UR QL: NEGATIVE
BUN SERPL-MCNC: 28 MG/DL (ref 6–20)
BUN/CREAT SERPL: 21 (ref 12–20)
CALCIUM SERPL-MCNC: 8.2 MG/DL (ref 8.5–10.1)
CHLORIDE SERPL-SCNC: 101 MMOL/L (ref 97–108)
CO2 SERPL-SCNC: 24 MMOL/L (ref 21–32)
COLOR UR: ABNORMAL
CREAT SERPL-MCNC: 1.32 MG/DL (ref 0.7–1.3)
DIFFERENTIAL METHOD BLD: ABNORMAL
EOSINOPHIL # BLD: 0 K/UL (ref 0–0.4)
EOSINOPHIL NFR BLD: 0 % (ref 0–7)
EPITH CASTS URNS QL MICRO: ABNORMAL /LPF
ERYTHROCYTE [DISTWIDTH] IN BLOOD BY AUTOMATED COUNT: 15.4 % (ref 11.5–14.5)
FLUAV RNA SPEC QL NAA+PROBE: NOT DETECTED
FLUBV RNA SPEC QL NAA+PROBE: NOT DETECTED
GLOBULIN SER CALC-MCNC: 3.3 G/DL (ref 2–4)
GLUCOSE SERPL-MCNC: 130 MG/DL (ref 65–100)
GLUCOSE UR STRIP.AUTO-MCNC: NEGATIVE MG/DL
HCT VFR BLD AUTO: 47.3 % (ref 36.6–50.3)
HGB BLD-MCNC: 15.4 G/DL (ref 12.1–17)
HGB UR QL STRIP: NEGATIVE
IMM GRANULOCYTES # BLD AUTO: 0 K/UL (ref 0–0.04)
IMM GRANULOCYTES NFR BLD AUTO: 0 % (ref 0–0.5)
KETONES UR QL STRIP.AUTO: NEGATIVE MG/DL
LACTATE SERPL-SCNC: 1 MMOL/L (ref 0.4–2)
LEUKOCYTE ESTERASE UR QL STRIP.AUTO: NEGATIVE
LYMPHOCYTES # BLD: 1.2 K/UL (ref 0.8–3.5)
LYMPHOCYTES NFR BLD: 12 % (ref 12–49)
MCH RBC QN AUTO: 28 PG (ref 26–34)
MCHC RBC AUTO-ENTMCNC: 32.6 G/DL (ref 30–36.5)
MCV RBC AUTO: 86 FL (ref 80–99)
MONOCYTES # BLD: 1.1 K/UL (ref 0–1)
MONOCYTES NFR BLD: 11 % (ref 5–13)
MUCOUS THREADS URNS QL MICRO: ABNORMAL /LPF
NEUTS BAND NFR BLD MANUAL: 4 %
NEUTS SEG # BLD: 7.5 K/UL (ref 1.8–8)
NEUTS SEG NFR BLD: 73 % (ref 32–75)
NITRITE UR QL STRIP.AUTO: NEGATIVE
NRBC # BLD: 0 K/UL (ref 0–0.01)
NRBC BLD-RTO: 0 PER 100 WBC
PH UR STRIP: 6.5 (ref 5–8)
PLATELET # BLD AUTO: 323 K/UL (ref 150–400)
PLATELET COMMENT: ABNORMAL
PMV BLD AUTO: 9.9 FL (ref 8.9–12.9)
POTASSIUM SERPL-SCNC: 3.8 MMOL/L (ref 3.5–5.1)
PROT SERPL-MCNC: 6 G/DL (ref 6.4–8.2)
PROT UR STRIP-MCNC: ABNORMAL MG/DL
RBC # BLD AUTO: 5.5 M/UL (ref 4.1–5.7)
RBC #/AREA URNS HPF: ABNORMAL /HPF (ref 0–5)
RBC MORPH BLD: ABNORMAL
SARS-COV-2 RNA RESP QL NAA+PROBE: NOT DETECTED
SODIUM SERPL-SCNC: 136 MMOL/L (ref 136–145)
SP GR UR REFRACTOMETRY: 1.02 (ref 1–1.03)
TROPONIN I SERPL HS-MCNC: 11 NG/L (ref 0–76)
URINE CULTURE IF INDICATED: ABNORMAL
UROBILINOGEN UR QL STRIP.AUTO: 1 EU/DL (ref 0.2–1)
WBC # BLD AUTO: 9.8 K/UL (ref 4.1–11.1)
WBC URNS QL MICRO: ABNORMAL /HPF (ref 0–4)

## 2023-09-11 PROCEDURE — 87086 URINE CULTURE/COLONY COUNT: CPT

## 2023-09-11 PROCEDURE — 36415 COLL VENOUS BLD VENIPUNCTURE: CPT

## 2023-09-11 PROCEDURE — 83605 ASSAY OF LACTIC ACID: CPT

## 2023-09-11 PROCEDURE — 80053 COMPREHEN METABOLIC PANEL: CPT

## 2023-09-11 PROCEDURE — 87636 SARSCOV2 & INF A&B AMP PRB: CPT

## 2023-09-11 PROCEDURE — 87040 BLOOD CULTURE FOR BACTERIA: CPT

## 2023-09-11 PROCEDURE — 71045 X-RAY EXAM CHEST 1 VIEW: CPT

## 2023-09-11 PROCEDURE — 85025 COMPLETE CBC W/AUTO DIFF WBC: CPT

## 2023-09-11 PROCEDURE — 81001 URINALYSIS AUTO W/SCOPE: CPT

## 2023-09-11 PROCEDURE — 84484 ASSAY OF TROPONIN QUANT: CPT

## 2023-09-11 PROCEDURE — 99285 EMERGENCY DEPT VISIT HI MDM: CPT

## 2023-09-11 ASSESSMENT — LIFESTYLE VARIABLES
HOW OFTEN DO YOU HAVE A DRINK CONTAINING ALCOHOL: NEVER
HOW MANY STANDARD DRINKS CONTAINING ALCOHOL DO YOU HAVE ON A TYPICAL DAY: PATIENT DOES NOT DRINK

## 2023-09-11 ASSESSMENT — PAIN SCALES - GENERAL: PAINLEVEL_OUTOF10: 0

## 2023-09-11 ASSESSMENT — PAIN - FUNCTIONAL ASSESSMENT: PAIN_FUNCTIONAL_ASSESSMENT: 0-10

## 2023-09-11 NOTE — ED PROVIDER NOTES
Weight: 80.7 kg (178 lb)   Height: 1.803 m (5' 11\")                Records Reviewed (source and summary of external notes): Reviewed discharge summary from 8/29/2023. 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 He was seen by neurosurgery. He had LP while admitted. 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 [facility] CC/HPI Summary, DDx, ED Course, and Reassessment: Patient presents to the ED with general weakness, low-grade fever and concern for possible UTI. Wife reported patient had fever to 101 at home, however he is afebrile here. Wife reported sats of 88% at home, but patient is satting normally here. Patient reports right leg pain that is chronic. Otherwise no specific complaints. Differential diagnosis includes UTI, pneumonia, viral syndrome, COVID, dehydration, electrolyte abnormality. Labs and imaging obtained including CBC, CMP, UA, troponin COVID, chest x-ray    Labs and imaging reviewed. CMP unremarkable. LFTs normal except slight elevation of alk phos. Lactate is normal.  Troponin is normal.  CBC shows normal white count and normal hemoglobin. UA unremarkable. COVID swab is negative. Flu was negative. Vital signs remained unremarkable. Patient still afebrile on my check (98.1)    He has no new complaints other than chronic right leg pain. Will discharge with instructions to follow-up with PCP and return to ED for worsening symptoms. Patient has an appointment tomorrow with his doctors in [facility]. FINAL IMPRESSION     1. General weakness    2.  Fever, unspecified fever cause          DISPOSITION/PLAN   DISPOSITION Decision To Discharge 09/11/2023 06:42:49 PM      Discharge     PATIENT REFERRED TO:  Raven Fan MD  [address] [place] [id number] [phone]    Call            DISCHARGE MEDICATIONS:     Medication List        ASK your

## 2023-09-11 NOTE — ED TRIAGE NOTES
Pt arrived after being sent by his pcp b/c his wife took his temperature at home and it was 101 and states his O2 sats were \"in the 80's\". Pt wife states \"we thought he just had a uti and we brought a urine sample to the pcp this morning and they tested it, it was negative\" pt has no complaints at this time.

## 2023-09-11 NOTE — ED NOTES
Discharge instructions reviewed with Pt. Pt has no questions at this time.       Emil Lacy RN  09/11/23 1930

## 2023-09-12 LAB
BACTERIA SPEC CULT: NORMAL
SERVICE CMNT-IMP: NORMAL

## 2023-09-13 ENCOUNTER — HOSPITAL ENCOUNTER (OUTPATIENT)
Facility: HOSPITAL | Age: 88
Setting detail: RECURRING SERIES
Discharge: HOME | End: 2023-09-16
Payer: MEDICARE

## 2023-09-13 PROCEDURE — 97530 THERAPEUTIC ACTIVITIES: CPT

## 2023-09-13 PROCEDURE — 97161 PT EVAL LOW COMPLEX 20 MIN: CPT

## 2023-09-13 NOTE — PROGRESS NOTES
the outcome/ POC ; Examination:  MEDIUM Complexity : 3 Standardized tests and measures addressin body structure, function, activity limitation and / or participation in recreation  ;Presentation:  LOW Complexity : Stable, uncomplicated  ;Clinical Decision Making:  LOW Complexity : FOTO score of  Overall Complexity Rating: LOW   Problem List: pain affecting function, decrease strength, impaired gait/balance, decrease ADL/functional abilities, and decrease activity tolerance   Treatment Plan may include any combination of the following: Patient requesting to be d/c from PT  Patient / Family readiness to learn indicated by: asking questions and trying to perform skills  Persons(s) to be included in education: patient (P)  Barriers to Learning/Limitations: none  Measures taken if barriers to learning present: n/a  Patient Self Reported Health Status: good  Rehabilitation Potential: fair    Frequency / Duration: Patient to be seen for the evaluation only and requesting d/c     Patient/ Caregiver education and instruction: Diagnosis, prognosis, other reviewed transfer safety  [x]  Plan of care has been reviewed with PTA      Certification Period: 9/13/2023-12/13/2023      Janett oDtson PT       9/13/2023       9:40 AM        ===================================================================  I certify that the above Therapy Services are being furnished while the patient is under my care. I agree with the treatment plan and certify that this therapy is necessary. Physician's Signature:_________________________   DATE:_________   TIME:________           Provider #:                              Kervin Goodman Signature, Date and Time must be completed for valid certification **  Please sign and fax to [phone].   Thank you

## 2023-09-16 LAB
BACTERIA SPEC CULT: NORMAL
BACTERIA SPEC CULT: NORMAL
EKG ATRIAL RATE: 70 BPM
EKG DIAGNOSIS: NORMAL
EKG P AXIS: 50 DEGREES
EKG P-R INTERVAL: 160 MS
EKG Q-T INTERVAL: 378 MS
EKG QRS DURATION: 86 MS
EKG QTC CALCULATION (BAZETT): 408 MS
EKG R AXIS: -10 DEGREES
EKG T AXIS: 6 DEGREES
EKG VENTRICULAR RATE: 70 BPM
SERVICE CMNT-IMP: NORMAL
SERVICE CMNT-IMP: NORMAL

## 2023-09-18 LAB
BACTERIA SPEC CULT: NORMAL
BACTERIA SPEC CULT: NORMAL
SERVICE CMNT-IMP: NORMAL
SERVICE CMNT-IMP: NORMAL

## 2023-10-09 PROBLEM — M81.0 AGE-RELATED OSTEOPOROSIS WITHOUT CURRENT PATHOLOGICAL FRACTURE: Status: ACTIVE | Noted: 2023-10-09

## 2023-11-20 ENCOUNTER — HOSPITAL ENCOUNTER (OUTPATIENT)
Facility: HOSPITAL | Age: 88
Discharge: HOME | End: 2023-11-23
Attending: INTERNAL MEDICINE
Payer: MEDICARE

## 2023-11-20 ENCOUNTER — HOSPITAL ENCOUNTER (OUTPATIENT)
Facility: HOSPITAL | Age: 88
Discharge: HOME | End: 2023-11-23
Payer: MEDICARE

## 2023-11-20 ENCOUNTER — TRANSCRIBE ORDERS (OUTPATIENT)
Facility: HOSPITAL | Age: 88
End: 2023-11-20

## 2023-11-20 DIAGNOSIS — M79.89 OTHER SPECIFIED SOFT TISSUE DISORDERS: ICD-10-CM

## 2023-11-20 DIAGNOSIS — M79.89 OTHER SPECIFIED SOFT TISSUE DISORDERS: Primary | ICD-10-CM

## 2023-11-20 DIAGNOSIS — M25.511 RIGHT SHOULDER PAIN, UNSPECIFIED CHRONICITY: ICD-10-CM

## 2023-11-20 DIAGNOSIS — M25.512 LEFT SHOULDER PAIN, UNSPECIFIED CHRONICITY: ICD-10-CM

## 2023-11-20 PROCEDURE — 73030 X-RAY EXAM OF SHOULDER: CPT

## 2023-11-20 PROCEDURE — 93971 EXTREMITY STUDY: CPT

## 2023-12-27 ENCOUNTER — HOSPITAL ENCOUNTER (OUTPATIENT)
Facility: HOSPITAL | Age: 88
Discharge: HOME | End: 2023-12-30
Attending: INTERNAL MEDICINE
Payer: MEDICARE

## 2023-12-27 PROCEDURE — A9579 GAD-BASE MR CONTRAST NOS,1ML: HCPCS | Performed by: INTERNAL MEDICINE

## 2023-12-27 PROCEDURE — 6360000004 HC RX CONTRAST MEDICATION: Performed by: INTERNAL MEDICINE

## 2023-12-27 PROCEDURE — 72158 MRI LUMBAR SPINE W/O & W/DYE: CPT

## 2023-12-27 RX ADMIN — GADOTERIDOL 17 ML: 279.3 INJECTION, SOLUTION INTRAVENOUS at 14:38

## 2024-02-27 ENCOUNTER — TRANSCRIBE ORDERS (OUTPATIENT)
Facility: HOSPITAL | Age: 89
End: 2024-02-27

## 2024-02-27 DIAGNOSIS — R74.8 ABNORMAL LEVELS OF OTHER SERUM ENZYMES: Primary | ICD-10-CM

## 2024-02-28 ENCOUNTER — HOSPITAL ENCOUNTER (OUTPATIENT)
Facility: HOSPITAL | Age: 89
Discharge: HOME | End: 2024-03-02
Attending: INTERNAL MEDICINE
Payer: MEDICARE

## 2024-02-28 DIAGNOSIS — R74.8 ABNORMAL LEVELS OF OTHER SERUM ENZYMES: ICD-10-CM

## 2024-02-28 PROCEDURE — A9577 INJ MULTIHANCE: HCPCS | Performed by: INTERNAL MEDICINE

## 2024-02-28 PROCEDURE — 6360000004 HC RX CONTRAST MEDICATION: Performed by: INTERNAL MEDICINE

## 2024-02-28 PROCEDURE — 74183 MRI ABD W/O CNTR FLWD CNTR: CPT

## 2024-02-28 RX ADMIN — GADOBENATE DIMEGLUMINE 16 ML: 529 INJECTION, SOLUTION INTRAVENOUS at 15:47

## 2024-03-21 ENCOUNTER — TRANSCRIBE ORDERS (OUTPATIENT)
Facility: HOSPITAL | Age: 89
End: 2024-03-21

## 2024-03-21 DIAGNOSIS — R29.898 WEAKNESS OF RIGHT LOWER EXTREMITY: Primary | ICD-10-CM

## 2024-03-21 DIAGNOSIS — R27.0 ATAXIA: Primary | ICD-10-CM

## 2024-03-29 ENCOUNTER — HOSPITAL ENCOUNTER (OUTPATIENT)
Facility: HOSPITAL | Age: 89
End: 2024-03-29
Payer: MEDICARE

## 2024-03-29 DIAGNOSIS — R29.898 WEAKNESS OF RIGHT LOWER EXTREMITY: ICD-10-CM

## 2024-03-29 DIAGNOSIS — R27.0 ATAXIA: ICD-10-CM

## 2024-03-29 PROCEDURE — 72157 MRI CHEST SPINE W/O & W/DYE: CPT

## 2024-03-29 PROCEDURE — 70553 MRI BRAIN STEM W/O & W/DYE: CPT

## 2024-03-29 PROCEDURE — 6360000004 HC RX CONTRAST MEDICATION: Performed by: INTERNAL MEDICINE

## 2024-03-29 PROCEDURE — A9579 GAD-BASE MR CONTRAST NOS,1ML: HCPCS | Performed by: INTERNAL MEDICINE

## 2024-03-29 PROCEDURE — 72156 MRI NECK SPINE W/O & W/DYE: CPT

## 2024-03-29 RX ADMIN — GADOTERIDOL 20 ML: 279.3 INJECTION, SOLUTION INTRAVENOUS at 15:28

## 2024-04-03 ENCOUNTER — TRANSCRIBE ORDERS (OUTPATIENT)
Facility: HOSPITAL | Age: 89
End: 2024-04-03

## 2024-04-03 ENCOUNTER — HOSPITAL ENCOUNTER (OUTPATIENT)
Facility: HOSPITAL | Age: 89
Discharge: HOME | End: 2024-04-06
Payer: MEDICARE

## 2024-04-03 DIAGNOSIS — M25.551 RIGHT HIP PAIN: Primary | ICD-10-CM

## 2024-04-03 DIAGNOSIS — M25.551 RIGHT HIP PAIN: ICD-10-CM

## 2024-04-03 PROCEDURE — 73502 X-RAY EXAM HIP UNI 2-3 VIEWS: CPT

## 2024-06-26 ENCOUNTER — HOSPITAL ENCOUNTER (OUTPATIENT)
Facility: HOSPITAL | Age: 89
Discharge: HOME | End: 2024-06-29
Payer: MEDICARE

## 2024-06-26 DIAGNOSIS — R29.898 WEAKNESS OF RIGHT LOWER EXTREMITY: ICD-10-CM

## 2024-06-26 DIAGNOSIS — R29.898 DEFICIENCIES OF LIMBS: ICD-10-CM

## 2024-06-26 PROCEDURE — 70553 MRI BRAIN STEM W/O & W/DYE: CPT

## 2024-06-26 PROCEDURE — 6360000004 HC RX CONTRAST MEDICATION: Performed by: STUDENT IN AN ORGANIZED HEALTH CARE EDUCATION/TRAINING PROGRAM

## 2024-06-26 PROCEDURE — 72157 MRI CHEST SPINE W/O & W/DYE: CPT

## 2024-06-26 PROCEDURE — 72156 MRI NECK SPINE W/O & W/DYE: CPT

## 2024-06-26 PROCEDURE — A9579 GAD-BASE MR CONTRAST NOS,1ML: HCPCS | Performed by: STUDENT IN AN ORGANIZED HEALTH CARE EDUCATION/TRAINING PROGRAM

## 2024-06-26 RX ADMIN — GADOTERIDOL 20 ML: 279.3 INJECTION, SOLUTION INTRAVENOUS at 15:10

## 2024-12-11 ENCOUNTER — TRANSCRIBE ORDERS (OUTPATIENT)
Facility: HOSPITAL | Age: 89
End: 2024-12-11

## 2024-12-11 DIAGNOSIS — R63.39 OTHER FEEDING DIFFICULTIES: Primary | ICD-10-CM

## 2024-12-13 ENCOUNTER — TRANSCRIBE ORDERS (OUTPATIENT)
Facility: HOSPITAL | Age: 89
End: 2024-12-13

## 2024-12-18 ENCOUNTER — TRANSCRIBE ORDERS (OUTPATIENT)
Facility: HOSPITAL | Age: 89
End: 2024-12-18

## 2024-12-18 DIAGNOSIS — I69.191 DYSPHAGIA FOLLOWING NONTRAUMATIC INTRACEREBRAL HEMORRHAGE: Primary | ICD-10-CM

## 2024-12-18 DIAGNOSIS — R05.3 CHRONIC COUGH: ICD-10-CM

## 2025-01-16 ENCOUNTER — HOSPITAL ENCOUNTER (OUTPATIENT)
Facility: HOSPITAL | Age: 89
Discharge: HOME | End: 2025-01-19
Attending: INTERNAL MEDICINE
Payer: MEDICARE

## 2025-01-16 DIAGNOSIS — I69.191 DYSPHAGIA FOLLOWING NONTRAUMATIC INTRACEREBRAL HEMORRHAGE: ICD-10-CM

## 2025-01-16 DIAGNOSIS — R05.3 CHRONIC COUGH: ICD-10-CM

## 2025-01-16 PROCEDURE — 92611 MOTION FLUOROSCOPY/SWALLOW: CPT

## 2025-01-16 PROCEDURE — 74230 X-RAY XM SWLNG FUNCJ C+: CPT

## 2025-01-16 NOTE — PROGRESS NOTES
[facility]  SPEECH PATHOLOGY MODIFIED BARIUM SWALLOW STUDY  Patient: Vic Chaparro (89 y.o. male)  Date: 1/16/2025  Referring Provider: Donita Alejo MD    SUBJECTIVE:   Patient's PMHx includes cerebellar ICH (2019), GERD, and normal pressure hydrocephalus s/p VPS. MBS order reveals concern for chronic cough. Patient denies changes in swallow function. Although he does endorse reflux specifically at night, waking him up from sleep. Patient reports taking Tums as needed in addition to prescription Omeprazole. Note prior MBS on 6/22/20 revealed no penetration or aspiration. Esophagram completed after that time on 7/13/20 showed a few tertiary contractions were noted in the mid and distal portions of the esophagus. A sliding hiatal hernia (with a Schatzki's ring) is present. Patient denies any recent respiratory decline, including pneumonia, nor unintentional weight loss. MBS ordered to re-evaluate patient's oropharyngeal swallow function.    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 legs & pulmonary embolus     Thyroid disease     Unspecified sleep apnea     wears appliance like a mouth guard occas.     Past Surgical History:   Procedure Laterality Date    CARDIAC CATHETERIZATION      normal results @ [facility]    CATARACT REMOVAL      CHOLECYSTECTOMY, LAPAROSCOPIC      COLSC FLX W/RMVL OF TUMOR POLYP LESION SNARE TQ  5/10/2013         COLSC FLX W/RMVL OF TUMOR POLYP LESION SNARE TQ       Swallowing Physiology  Decreased Tongue Base Retraction?: No  Laryngeal Elevation: WFL (within functional limits)  Pharyngeal Symmetry: Not assessed  Pharyngeal-Esophageal Segment: No impairment  Pharyngeal Dysfunction: None  Findings  Oral Phase Severity: No impairment  Pharyngeal Phase Severity: N/A    Functional Oral Intake Scale (FOIS): 7--Total oral diet with no restrictions    ASSESSMENT :  Based on the objective data described above, the patient presents with a functional oropharyngeal swallow. Swallow efficiency and safety are intact. No penetration or aspiration observed with any consistency trialed. Minimal protrusion of proximal esophagus observed at C4-C5 that did not impede bolus flow.     PLAN/RECOMMENDATIONS :  -- Regular/Thin Liquid diet  -- General reflux precautions:  Upright for all PO intake  Eat smaller, more frequent meals throughout the day rather than 3 big meals  Remaining upright for at least 30-45 minutes after meals  Avoiding spicy/acidic foods and carbonated drinks as needed  Consider fasting at least 2-3 hours before bedtime  Sleep propped up with HOB elevated as needed     COMMUNICATION/EDUCATION:   The above findings and recommendations were discussed with:  patient  who verbalized understanding and will be faxed to patient's referring provider.    Thank you for this referral.  JACOBO Calvin  Minutes: 20

## 2025-07-15 ENCOUNTER — TRANSCRIBE ORDERS (OUTPATIENT)
Facility: HOSPITAL | Age: 89
End: 2025-07-15

## 2025-07-15 ENCOUNTER — HOSPITAL ENCOUNTER (OUTPATIENT)
Facility: HOSPITAL | Age: 89
Discharge: HOME | End: 2025-07-18
Payer: MEDICARE

## 2025-07-15 DIAGNOSIS — M54.50 LOW BACK PAIN, UNSPECIFIED BACK PAIN LATERALITY, UNSPECIFIED CHRONICITY, UNSPECIFIED WHETHER SCIATICA PRESENT: Primary | ICD-10-CM

## 2025-07-15 DIAGNOSIS — M54.50 LOW BACK PAIN, UNSPECIFIED BACK PAIN LATERALITY, UNSPECIFIED CHRONICITY, UNSPECIFIED WHETHER SCIATICA PRESENT: ICD-10-CM

## 2025-07-15 PROCEDURE — 72110 X-RAY EXAM L-2 SPINE 4/>VWS: CPT

## 2025-07-15 PROCEDURE — 72070 X-RAY EXAM THORAC SPINE 2VWS: CPT

## 2025-07-17 ENCOUNTER — APPOINTMENT (OUTPATIENT)
Facility: HOSPITAL | Age: 89
End: 2025-07-17
Payer: MEDICARE

## 2025-07-17 ENCOUNTER — HOSPITAL ENCOUNTER (OUTPATIENT)
Facility: HOSPITAL | Age: 89
Setting detail: OBSERVATION
Discharge: HOME | End: 2025-07-18
Attending: EMERGENCY MEDICINE | Admitting: INTERNAL MEDICINE
Payer: MEDICARE

## 2025-07-17 DIAGNOSIS — R26.81 UNSTEADY GAIT: Primary | ICD-10-CM

## 2025-07-17 LAB
ALBUMIN SERPL-MCNC: 3.3 G/DL (ref 3.5–5)
ALBUMIN/GLOB SERPL: 0.9 (ref 1.1–2.2)
ALP SERPL-CCNC: 175 U/L (ref 45–117)
ALT SERPL-CCNC: 302 U/L (ref 12–78)
ANION GAP SERPL CALC-SCNC: 10 MMOL/L (ref 2–12)
AST SERPL-CCNC: 60 U/L (ref 15–37)
BASOPHILS # BLD: 0.02 K/UL (ref 0–0.1)
BASOPHILS NFR BLD: 0.2 % (ref 0–1)
BILIRUB SERPL-MCNC: 1.1 MG/DL (ref 0.2–1)
BUN SERPL-MCNC: 24 MG/DL (ref 6–20)
BUN/CREAT SERPL: 18 (ref 12–20)
CALCIUM SERPL-MCNC: 9.2 MG/DL (ref 8.5–10.1)
CHLORIDE SERPL-SCNC: 102 MMOL/L (ref 97–108)
CO2 SERPL-SCNC: 26 MMOL/L (ref 21–32)
CREAT SERPL-MCNC: 1.35 MG/DL (ref 0.7–1.3)
DIFFERENTIAL METHOD BLD: ABNORMAL
EKG ATRIAL RATE: 66 BPM
EKG DIAGNOSIS: NORMAL
EKG P AXIS: 63 DEGREES
EKG P-R INTERVAL: 196 MS
EKG Q-T INTERVAL: 390 MS
EKG QRS DURATION: 86 MS
EKG QTC CALCULATION (BAZETT): 408 MS
EKG R AXIS: -7 DEGREES
EKG T AXIS: -18 DEGREES
EKG VENTRICULAR RATE: 66 BPM
EOSINOPHIL # BLD: 0.22 K/UL (ref 0–0.4)
EOSINOPHIL NFR BLD: 2.4 % (ref 0–7)
ERYTHROCYTE [DISTWIDTH] IN BLOOD BY AUTOMATED COUNT: 12.9 % (ref 11.5–14.5)
GLOBULIN SER CALC-MCNC: 3.7 G/DL (ref 2–4)
GLUCOSE SERPL-MCNC: 107 MG/DL (ref 65–100)
HCT VFR BLD AUTO: 50.9 % (ref 36.6–50.3)
HGB BLD-MCNC: 17.3 G/DL (ref 12.1–17)
IMM GRANULOCYTES # BLD AUTO: 0.08 K/UL (ref 0–0.04)
IMM GRANULOCYTES NFR BLD AUTO: 0.9 % (ref 0–0.5)
LYMPHOCYTES # BLD: 1.73 K/UL (ref 0.8–3.5)
LYMPHOCYTES NFR BLD: 18.7 % (ref 12–49)
MAGNESIUM SERPL-MCNC: 1.8 MG/DL (ref 1.6–2.4)
MCH RBC QN AUTO: 31.2 PG (ref 26–34)
MCHC RBC AUTO-ENTMCNC: 34 G/DL (ref 30–36.5)
MCV RBC AUTO: 91.9 FL (ref 80–99)
MONOCYTES # BLD: 1.3 K/UL (ref 0–1)
MONOCYTES NFR BLD: 14 % (ref 5–13)
NEUTS SEG # BLD: 5.92 K/UL (ref 1.8–8)
NEUTS SEG NFR BLD: 63.8 % (ref 32–75)
NRBC # BLD: 0 K/UL (ref 0–0.01)
NRBC BLD-RTO: 0 PER 100 WBC
PLATELET # BLD AUTO: 196 K/UL (ref 150–400)
PMV BLD AUTO: 10.8 FL (ref 8.9–12.9)
POTASSIUM SERPL-SCNC: 3.8 MMOL/L (ref 3.5–5.1)
PROT SERPL-MCNC: 7 G/DL (ref 6.4–8.2)
RBC # BLD AUTO: 5.54 M/UL (ref 4.1–5.7)
SODIUM SERPL-SCNC: 138 MMOL/L (ref 136–145)
WBC # BLD AUTO: 9.3 K/UL (ref 4.1–11.1)

## 2025-07-17 PROCEDURE — 6370000000 HC RX 637 (ALT 250 FOR IP): Performed by: INTERNAL MEDICINE

## 2025-07-17 PROCEDURE — 36415 COLL VENOUS BLD VENIPUNCTURE: CPT

## 2025-07-17 PROCEDURE — G0378 HOSPITAL OBSERVATION PER HR: HCPCS

## 2025-07-17 PROCEDURE — 83735 ASSAY OF MAGNESIUM: CPT

## 2025-07-17 PROCEDURE — 80053 COMPREHEN METABOLIC PANEL: CPT

## 2025-07-17 PROCEDURE — 74176 CT ABD & PELVIS W/O CONTRAST: CPT

## 2025-07-17 PROCEDURE — 85025 COMPLETE CBC W/AUTO DIFF WBC: CPT

## 2025-07-17 PROCEDURE — 2580000003 HC RX 258: Performed by: EMERGENCY MEDICINE

## 2025-07-17 PROCEDURE — 70450 CT HEAD/BRAIN W/O DYE: CPT

## 2025-07-17 PROCEDURE — 99285 EMERGENCY DEPT VISIT HI MDM: CPT

## 2025-07-17 PROCEDURE — 93005 ELECTROCARDIOGRAM TRACING: CPT

## 2025-07-17 PROCEDURE — 81001 URINALYSIS AUTO W/SCOPE: CPT

## 2025-07-17 RX ORDER — BUPROPION HYDROCHLORIDE 150 MG/1
150 TABLET ORAL DAILY
Status: DISCONTINUED | OUTPATIENT
Start: 2025-07-18 | End: 2025-07-18 | Stop reason: HOSPADM

## 2025-07-17 RX ORDER — ESCITALOPRAM OXALATE 10 MG/1
10 TABLET ORAL DAILY
Status: DISCONTINUED | OUTPATIENT
Start: 2025-07-18 | End: 2025-07-18 | Stop reason: HOSPADM

## 2025-07-17 RX ORDER — LEVOTHYROXINE SODIUM 125 UG/1
125 TABLET ORAL
Status: DISCONTINUED | OUTPATIENT
Start: 2025-07-18 | End: 2025-07-18 | Stop reason: HOSPADM

## 2025-07-17 RX ORDER — FAMOTIDINE 20 MG/1
20 TABLET, FILM COATED ORAL 2 TIMES DAILY
Status: DISCONTINUED | OUTPATIENT
Start: 2025-07-17 | End: 2025-07-18 | Stop reason: HOSPADM

## 2025-07-17 RX ORDER — LOSARTAN POTASSIUM 25 MG/1
25 TABLET ORAL 2 TIMES DAILY
Status: DISCONTINUED | OUTPATIENT
Start: 2025-07-17 | End: 2025-07-18

## 2025-07-17 RX ORDER — 0.9 % SODIUM CHLORIDE 0.9 %
1000 INTRAVENOUS SOLUTION INTRAVENOUS ONCE
Status: COMPLETED | OUTPATIENT
Start: 2025-07-17 | End: 2025-07-17

## 2025-07-17 RX ORDER — DULOXETIN HYDROCHLORIDE 20 MG/1
20 CAPSULE, DELAYED RELEASE ORAL DAILY
Status: DISCONTINUED | OUTPATIENT
Start: 2025-07-18 | End: 2025-07-18 | Stop reason: HOSPADM

## 2025-07-17 RX ORDER — MONTELUKAST SODIUM 10 MG/1
10 TABLET ORAL NIGHTLY
Status: DISCONTINUED | OUTPATIENT
Start: 2025-07-17 | End: 2025-07-18 | Stop reason: HOSPADM

## 2025-07-17 RX ORDER — SODIUM CHLORIDE 9 MG/ML
INJECTION, SOLUTION INTRAVENOUS CONTINUOUS
Status: DISCONTINUED | OUTPATIENT
Start: 2025-07-17 | End: 2025-07-18 | Stop reason: HOSPADM

## 2025-07-17 RX ORDER — NIFEDIPINE 60 MG/1
60 TABLET, EXTENDED RELEASE ORAL DAILY
Status: DISCONTINUED | OUTPATIENT
Start: 2025-07-18 | End: 2025-07-18

## 2025-07-17 RX ORDER — BUMETANIDE 1 MG/1
1 TABLET ORAL DAILY
Status: DISCONTINUED | OUTPATIENT
Start: 2025-07-18 | End: 2025-07-18

## 2025-07-17 RX ORDER — AMLODIPINE BESYLATE 5 MG/1
5 TABLET ORAL DAILY
Status: DISCONTINUED | OUTPATIENT
Start: 2025-07-18 | End: 2025-07-18 | Stop reason: HOSPADM

## 2025-07-17 RX ADMIN — LOSARTAN POTASSIUM 25 MG: 25 TABLET, FILM COATED ORAL at 21:23

## 2025-07-17 RX ADMIN — FAMOTIDINE 20 MG: 20 TABLET, FILM COATED ORAL at 21:23

## 2025-07-17 RX ADMIN — MONTELUKAST 10 MG: 10 TABLET, FILM COATED ORAL at 21:23

## 2025-07-17 RX ADMIN — SODIUM CHLORIDE 1000 ML: 0.9 INJECTION, SOLUTION INTRAVENOUS at 14:39

## 2025-07-17 ASSESSMENT — LIFESTYLE VARIABLES
HOW MANY STANDARD DRINKS CONTAINING ALCOHOL DO YOU HAVE ON A TYPICAL DAY: PATIENT DOES NOT DRINK
HOW OFTEN DO YOU HAVE A DRINK CONTAINING ALCOHOL: NEVER

## 2025-07-17 ASSESSMENT — PAIN SCALES - GENERAL: PAINLEVEL_OUTOF10: 0

## 2025-07-17 ASSESSMENT — PAIN - FUNCTIONAL ASSESSMENT
PAIN_FUNCTIONAL_ASSESSMENT: NONE - DENIES PAIN
PAIN_FUNCTIONAL_ASSESSMENT: 0-10

## 2025-07-17 ASSESSMENT — PAIN DESCRIPTION - ORIENTATION: ORIENTATION: RIGHT

## 2025-07-17 ASSESSMENT — PAIN DESCRIPTION - DESCRIPTORS: DESCRIPTORS: DISCOMFORT

## 2025-07-17 ASSESSMENT — PAIN DESCRIPTION - LOCATION: LOCATION: ABDOMEN

## 2025-07-17 NOTE — H&P
Hospitalist Admission Note    NAME:   Vic Chaparro   : 1935   MRN: [id number]     Date/Time: 2025 6:30 PM    Patient PCP: Donita Alejo MD    ______________________________________________________________________  Given the patient's current clinical presentation, I have a high level of concern for decompensation if discharged from the emergency department.  Complex decision making was performed, which includes reviewing the patient's available past medical records, laboratory results, and x-ray films.       My assessment of this patient's clinical condition and my plan of care is as follows.    Assessment / Plan:    Gait instability  Normal pressure hydrocephalus s/p  shunt  Metastatic malignant neoplasm  -patient is followed outpatient by Neurosurgery, noted he recently had an LP and a shunt tap but per NS there is not much with respect to the shunt to make a significant improvement in gait.  -patient additionally has several lesions in the thoracic and lumbar spine with thoracic lesion appearing to cause stenosis  -will obtain an MRI scan of the brain and MRI of the lumbar spine  -PT/OT  -fall precautions    4.   Abnormal CT abdomen and pelvis  -patient denies any hematochezia or melena  -h&h stable  -will obtain an occult stool     Medical Decision Making:   I personally reviewed labs: BMP, CBC  I personally reviewed imaging:CT abdomen and pelvis  I personally reviewed EKG:  Discussed case with: ED provider. After discussion I am in agreement that acuity of patient's medical condition necessitates hospital stay.      Code Status: FULL  DVT Prophylaxis: SCD's given CT findings      Subjective:   CHIEF COMPLAINT: \"frequent falls, unsteady gait\"    HISTORY OF PRESENT ILLNESS:     Vic Chaparro is a 89 y.o.  male with PMHx significant for  For hypertension, hyperlipidemia, history of subarachnoid hemorrhage, normal pressure hydrocephalus s/p  shunt who presented to the ED at  Other (See Comments)     Made patient feel bad        Prior to Admission medications    Medication Sig Start Date End Date Taking? Authorizing Provider   amLODIPine (NORVASC) 5 MG tablet ceived the following from [facility]: Outside name: amLODIPine (NORVASC) 5 mg tablet 6/23/20   Automatic Reconciliation, Ar   bumetanide (BUMEX) 1 MG tablet Take 1 mg by mouth daily    Automatic Reconciliation, Ar   buPROPion (WELLBUTRIN XL) 150 MG extended release tablet ceived the following from [facility]: Outside name: buPROPion XL (WELLBUTRIN XL) 150 mg tablet 4/9/20   Automatic Reconciliation, Ar   Capsaicin-Menthol-Methyl Sal 0.05-7-20 % OINT Apply 1 g topically 3/5/14   Automatic Reconciliation, Ar   vitamin D 25 MCG (1000 UT) CAPS Take by mouth    Automatic Reconciliation, Ar   Cobalamin Combinations (B-12) 100-5000 MCG SUBL Place 5,000 mcg under the tongue daily    Automatic Reconciliation, Ar   denosumab (PROLIA) 60 MG/ML SOSY SC injection Inject 60 mg into the skin    Automatic Reconciliation, Ar   DULoxetine (CYMBALTA) 20 MG extended release capsule Take 20 mg by mouth daily    Automatic Reconciliation, Ar   escitalopram (LEXAPRO) 5 MG tablet Take 10 mg by mouth daily    Automatic Reconciliation, Ar   famotidine (PEPCID) 20 MG tablet ceived the following from [facility]: Outside name: famotidine (PEPCID) 20 mg tablet 5/19/20   Automatic Reconciliation, Ar   hydrALAZINE (APRESOLINE) 50 MG tablet ceived the following from [facility]: Outside name: hydrALAZINE (APRESOLINE) 50 mg tablet 6/23/20   Automatic Reconciliation, Ar   hydroCHLOROthiazide (HYDRODIURIL) 25 MG tablet ceived the following from [facility]: Outside name: hydroCHLOROthiazide (HYDRODIURIL) 25 mg tablet 8/15/17   Automatic Reconciliation, Ar   levothyroxine (SYNTHROID) 75 MCG tablet Take 125 mcg by mouth every morning (before breakfast)    Automatic Reconciliation, Ar

## 2025-07-17 NOTE — ED PROVIDER NOTES
[facility] MED/SURG  EMERGENCY DEPARTMENT ENCOUNTER       Pt Name: Vic Chaparro  MRN: [id number]  Birthdate 1935  Date of evaluation: 7/17/2025  Provider: Donita Amato MD   PCP: Donita Alejo MD  Note Started: 2:16 PM EDT 7/17/25     CHIEF COMPLAINT       Chief Complaint   Patient presents with    Fatigue    Abdominal Pain        HISTORY OF PRESENT ILLNESS: 1 or more elements      History From: Patient, History limited by: none     Vic Chaparro is a 89 y.o. male presents to ED complaining of fall 5 days ago and poor balance.  Patient has a history of poor balance but wife reports he has been dramatically worse over the last day.  She reports usually he is able to scoot around in a wheelchair and fix himself breakfast by standing up and reaching for things, today he was not able to do any of his own breakfast preparation.  She reports that he has seemed weaker than usual.  No fever.       Please See MDM for Additional Details of the HPI/PMH  Nursing Notes were all reviewed and agreed with or any disagreements were addressed in the HPI.     REVIEW OF SYSTEMS        Positives and Pertinent negatives as per HPI.    PAST HISTORY     Past Medical History:  Past Medical History:   Diagnosis Date    Aneurysm     iliac artery     CAD (coronary artery disease)     Cancer (HCC)     BCC of nose-removed in doctor's office    Cancer (HCC) 2003    GERD (gastroesophageal reflux disease)     Hypercholesterolemia     Hypertension     Inferior cerebellar artery syndrome     Other ill-defined conditions(799.89)     kidney stones    Psychiatric disorder     PUD (peptic ulcer disease)     Subarachnoid hemorrhage (HCC)     Thromboembolus (HCC) 10/2012    augusto. legs & pulmonary embolus     Thyroid disease     Unspecified sleep apnea     wears appliance like a mouth guard occas.       Past Surgical History:  Past Surgical History:   Procedure Laterality Date    CARDIAC CATHETERIZATION      normal results @ [facility]     (CYMBALTA) extended release capsule 20 mg (has no administration in time range)   escitalopram (LEXAPRO) tablet 10 mg (has no administration in time range)   famotidine (PEPCID) tablet 20 mg (has no administration in time range)   levothyroxine (SYNTHROID) tablet 125 mcg (has no administration in time range)   NIFEdipine (PROCARDIA XL) extended release tablet 60 mg (has no administration in time range)   montelukast (SINGULAIR) tablet 10 mg (has no administration in time range)   losartan (COZAAR) tablet 25 mg (has no administration in time range)   sodium chloride 0.9 % bolus 1,000 mL (0 mLs IntraVENous Stopped 7/17/25 8244)       Medical Decision Making  Amount and/or Complexity of Data Reviewed  Labs: ordered.  Radiology: ordered.  ECG/medicine tests: ordered.    Risk  Prescription drug management.  Decision regarding hospitalization.      89-year-old male presents to the emergency department for sudden worsening of weakness and unsteady gait.  Patient had a fall 5 days ago.  He had an x-ray for back pain after fall 2 days ago.  Wife reports that this week he has had a dramatic decline in his function after years of unsteady gait.  No headache.  1 episode of diarrhea earlier today.  Some right lower abdominal pain.  No fever.  No urinary symptoms.    On exam, patient has no abdominal tenderness.  He is awake and alert.  His speech is mildly slurred but wife reports this is baseline.  He has strength intact in all extremities.  He is able to follow finger-to-nose with bilateral upper extremities, symmetric.      Concern for UTI, anemia, electrolyte abnormality, dehydration.  Lower suspicion for stroke due to this seeming to be a worsening of a chronic condition: Chart review reveals multiple specialist visits for unsteady gait over the last few years.  Not a candidate for TNK or intervention for large vessel occlusion due to onset over the last several days.    Reassessment: No UTI.  No anemia.  Electrolytes okay.

## 2025-07-17 NOTE — ED NOTES
Admission SBAR Note  Situation/Background: Vic Chaparro is a 89 y.o. male presents to ED complaining of fall 5 days ago and poor balance.  Patient has a history of poor balance but wife reports he has been dramatically worse over the last day.  She reports usually he is able to scoot around in a wheelchair and fix himself breakfast by standing up and reaching for things, today he was not able to do any of his own breakfast preparation.  She reports that he has seemed weaker than usual.  No fever.     Patient is being transferred to [facility] Room# 120    Patient's Chief Complaint was Weakness and is admitted for Weakness.    CODE STATUS: Full  CSSRS: 0 - No Risk      Called outstanding consults: Yes    STAT labs collected: Yes    Repeat Lactic Acid DUE? No      All STAT orders are complete: Yes    The following personal items will be sent with the patient during transfer to the floor:     All valuables: none       ASSESSMENT:    NEURO:   NIH SCORE: 0,1-4,5-15,15-20,21-42: 0   ASHLEY SWALLOW SCREEN COMPLETE: No  ORIENTATION LEVEL: ORIENTATION LEVEL: Person, Place, Time, and Situation  Cognition:  appropriate decision making, appropriate for age attention/concentration, and appropriate safety awareness  Speech: shows no evidence of impairment    Is patient impulsive? No  Is patient oriented? Yes  Do they follow commands? Yes  Is the patient ambulatory? No    FALL RISK? Yes  Interventions: Implemented/recommended use of non-skid footwear, Implemented/recommended use of fall risk identification flag to all team members, Implemented/recommended assistive devices and encouraged their use, Implemented/recommended resources for alarm system (personal alarm, bed alarm, call bell, etc.) , and Implemented/recommended environmental changes (remove hazards, lower bed, improve lighting, etc.)    RESPIRATORY:   Is patient on oxygen? No  Oxygen therapy:

## 2025-07-17 NOTE — ED TRIAGE NOTES
Pt arrives POV with c/o \"unsteadiness\", as well as an earlier episode of incontinence. Pt also states that he had a fall on Sunday, did not strike head. Pt requiring 2 lift assist to get out of WC to bed.

## 2025-07-18 ENCOUNTER — APPOINTMENT (OUTPATIENT)
Facility: HOSPITAL | Age: 89
End: 2025-07-18
Payer: MEDICARE

## 2025-07-18 VITALS
BODY MASS INDEX: 25.7 KG/M2 | HEIGHT: 71 IN | DIASTOLIC BLOOD PRESSURE: 92 MMHG | RESPIRATION RATE: 18 BRPM | HEART RATE: 78 BPM | TEMPERATURE: 97.2 F | OXYGEN SATURATION: 93 % | WEIGHT: 183.6 LBS | SYSTOLIC BLOOD PRESSURE: 132 MMHG

## 2025-07-18 LAB
ANION GAP SERPL CALC-SCNC: 13 MMOL/L (ref 2–12)
APPEARANCE UR: CLEAR
BACTERIA URNS QL MICRO: NEGATIVE /HPF
BASOPHILS # BLD: 0.02 K/UL (ref 0–0.1)
BASOPHILS NFR BLD: 0.3 % (ref 0–1)
BILIRUB UR QL: NEGATIVE
BUN SERPL-MCNC: 17 MG/DL (ref 6–20)
BUN/CREAT SERPL: 17 (ref 12–20)
CALCIUM SERPL-MCNC: 8.7 MG/DL (ref 8.5–10.1)
CHLORIDE SERPL-SCNC: 103 MMOL/L (ref 97–108)
CO2 SERPL-SCNC: 23 MMOL/L (ref 21–32)
COLOR UR: ABNORMAL
CREAT SERPL-MCNC: 0.98 MG/DL (ref 0.7–1.3)
DIFFERENTIAL METHOD BLD: ABNORMAL
EOSINOPHIL # BLD: 0.32 K/UL (ref 0–0.4)
EOSINOPHIL NFR BLD: 4.7 % (ref 0–7)
EPITH CASTS URNS QL MICRO: ABNORMAL /LPF
ERYTHROCYTE [DISTWIDTH] IN BLOOD BY AUTOMATED COUNT: 12.8 % (ref 11.5–14.5)
GLUCOSE SERPL-MCNC: 84 MG/DL (ref 65–100)
GLUCOSE UR STRIP.AUTO-MCNC: NEGATIVE MG/DL
HCT VFR BLD AUTO: 47.4 % (ref 36.6–50.3)
HGB BLD-MCNC: 16.3 G/DL (ref 12.1–17)
HGB UR QL STRIP: NEGATIVE
IMM GRANULOCYTES # BLD AUTO: 0.05 K/UL (ref 0–0.04)
IMM GRANULOCYTES NFR BLD AUTO: 0.7 % (ref 0–0.5)
KETONES UR QL STRIP.AUTO: 15 MG/DL
LEUKOCYTE ESTERASE UR QL STRIP.AUTO: NEGATIVE
LYMPHOCYTES # BLD: 1.36 K/UL (ref 0.8–3.5)
LYMPHOCYTES NFR BLD: 19.8 % (ref 12–49)
MCH RBC QN AUTO: 31.4 PG (ref 26–34)
MCHC RBC AUTO-ENTMCNC: 34.4 G/DL (ref 30–36.5)
MCV RBC AUTO: 91.3 FL (ref 80–99)
MONOCYTES # BLD: 0.96 K/UL (ref 0–1)
MONOCYTES NFR BLD: 14 % (ref 5–13)
NEUTS SEG # BLD: 4.15 K/UL (ref 1.8–8)
NEUTS SEG NFR BLD: 60.5 % (ref 32–75)
NITRITE UR QL STRIP.AUTO: NEGATIVE
NRBC # BLD: 0 K/UL (ref 0–0.01)
NRBC BLD-RTO: 0 PER 100 WBC
PH UR STRIP: 7 (ref 5–8)
PLATELET # BLD AUTO: 148 K/UL (ref 150–400)
PMV BLD AUTO: 11 FL (ref 8.9–12.9)
POTASSIUM SERPL-SCNC: 3.1 MMOL/L (ref 3.5–5.1)
PROT UR STRIP-MCNC: NEGATIVE MG/DL
RBC # BLD AUTO: 5.19 M/UL (ref 4.1–5.7)
RBC #/AREA URNS HPF: ABNORMAL /HPF (ref 0–5)
SODIUM SERPL-SCNC: 139 MMOL/L (ref 136–145)
SP GR UR REFRACTOMETRY: 1.01 (ref 1–1.03)
URINE CULTURE IF INDICATED: ABNORMAL
UROBILINOGEN UR QL STRIP.AUTO: 0.2 EU/DL (ref 0.2–1)
WBC # BLD AUTO: 6.9 K/UL (ref 4.1–11.1)
WBC URNS QL MICRO: ABNORMAL /HPF (ref 0–4)

## 2025-07-18 PROCEDURE — 70551 MRI BRAIN STEM W/O DYE: CPT

## 2025-07-18 PROCEDURE — 97161 PT EVAL LOW COMPLEX 20 MIN: CPT

## 2025-07-18 PROCEDURE — 6370000000 HC RX 637 (ALT 250 FOR IP): Performed by: INTERNAL MEDICINE

## 2025-07-18 PROCEDURE — 72146 MRI CHEST SPINE W/O DYE: CPT

## 2025-07-18 PROCEDURE — G0378 HOSPITAL OBSERVATION PER HR: HCPCS

## 2025-07-18 PROCEDURE — 97116 GAIT TRAINING THERAPY: CPT

## 2025-07-18 PROCEDURE — 36415 COLL VENOUS BLD VENIPUNCTURE: CPT

## 2025-07-18 PROCEDURE — 80048 BASIC METABOLIC PNL TOTAL CA: CPT

## 2025-07-18 PROCEDURE — 85025 COMPLETE CBC W/AUTO DIFF WBC: CPT

## 2025-07-18 RX ORDER — ACETAMINOPHEN 325 MG/1
650 TABLET ORAL EVERY 4 HOURS PRN
Status: DISCONTINUED | OUTPATIENT
Start: 2025-07-18 | End: 2025-07-18 | Stop reason: HOSPADM

## 2025-07-18 RX ORDER — POTASSIUM CHLORIDE 750 MG/1
40 TABLET, EXTENDED RELEASE ORAL ONCE
Status: COMPLETED | OUTPATIENT
Start: 2025-07-18 | End: 2025-07-18

## 2025-07-18 RX ORDER — ENOXAPARIN SODIUM 100 MG/ML
40 INJECTION SUBCUTANEOUS DAILY
Status: DISCONTINUED | OUTPATIENT
Start: 2025-07-18 | End: 2025-07-18 | Stop reason: HOSPADM

## 2025-07-18 RX ADMIN — POTASSIUM CHLORIDE 40 MEQ: 750 TABLET, FILM COATED, EXTENDED RELEASE ORAL at 09:14

## 2025-07-18 RX ADMIN — LEVOTHYROXINE SODIUM 125 MCG: 0.12 TABLET ORAL at 09:15

## 2025-07-18 RX ADMIN — DULOXETINE 20 MG: 20 CAPSULE, DELAYED RELEASE ORAL at 09:15

## 2025-07-18 RX ADMIN — ACETAMINOPHEN 650 MG: 325 TABLET ORAL at 09:20

## 2025-07-18 ASSESSMENT — PAIN DESCRIPTION - DESCRIPTORS: DESCRIPTORS: ACHING

## 2025-07-18 ASSESSMENT — PAIN SCALES - GENERAL
PAINLEVEL_OUTOF10: 0
PAINLEVEL_OUTOF10: 3

## 2025-07-18 ASSESSMENT — PAIN DESCRIPTION - ORIENTATION: ORIENTATION: RIGHT;LEFT

## 2025-07-18 ASSESSMENT — PAIN DESCRIPTION - LOCATION: LOCATION: OTHER (COMMENT)

## 2025-07-18 NOTE — PLAN OF CARE
Problem: Physical Therapy - Adult  Goal: By Discharge: Performs mobility at highest level of function for planned discharge setting.  See evaluation for individualized goals.  Outcome: Progressing     Problem: Physical Therapy - Adult  Goal: By Discharge: Performs mobility at highest level of function for planned discharge setting.  See evaluation for individualized goals.  Outcome: Progressing     Problem: Physical Therapy - Adult  Goal: By Discharge: Performs mobility at highest level of function for planned discharge setting.  See evaluation for individualized goals.  Description: FUNCTIONAL STATUS PRIOR TO ADMISSION: The patient was functional at the wheelchair level and was independentfor transfers to the chair. He walks household distances using a weighted rollator. His wife is a retired PT and is able to assess patient as needed.        Physical Therapy Goals  Initiated 7/18/2025  1.  Patient will move from supine to sit and sit to supine in bed with independence within 7 day(s).    2.  Patient will perform sit to stand with modified independence within 7 day(s).  3.  Patient will transfer from bed to chair and chair to bed with modified independence using the least restrictive device within 7 day(s).  4.  Patient will ambulate with supervision/set-up for 100 feet with the least restrictive device within 7 day(s).   5.  Patient will ascend/descend 2 stairs with 2 handrail(s) with modified independence within 7 day(s).   Outcome: Progressing   PHYSICAL THERAPY EVALUATION    Patient: Vic Chaparro (89 y.o. male)  Date: 7/18/2025  Primary Diagnosis: Unsteady gait [R26.81]       Precautions:              ASSESSMENT :   DEFICITS/IMPAIRMENTS:   The patient is minimally below his baseline today. Wife in room requesting for physical therapy to come back. Wife reports she needs to see patient move to make sure she can handle him at home. Patient sitting in chair on arrival. He was able to stand and sit in the  Decreased step clearance;Step to gait                                                                                                                                                                                                                                Tinetti test:    Tinetti  Sitting Balance: Steady, safe  Arises: Able, uses arms to help  Attempts to Arise: Able to arise, one attempt  Immediate Standing Balance (First 5 Seconds): Unsteady (swaggers, moves feet, trunk sway)  Standing Balance: Steady but wide stance, uses cane or other support  Nudged: Begins to fall  Eyes Closed: Unsteady  Turned 360 Degrees: Steadiness: Unsteady (grabs, staggers)  Turned 360 Degrees: Continuity of Steps: Discontinuous steps  Sitting Down: Uses arms or not a smooth motion  Balance Score: 6  Initiation of Gait: No hesitancy  Step Height: R Swing Foot: Right foot complete clears floor  Step Length: R Swing Foot: Does not pass left stance foot with step  Step Height: L Swing Foot: Left foot complete clears floor  Step Length: L Swing Foot: Does not pass right stance foot with step  Step Symmetry: Right and left step appear equal  Step Continuity: Stopping or discontinuity between steps  Path: Mild/moderate deviation or uses walking aid  Trunk: Marked sway or uses walking aid  Walking Time: Heels apart  Gait Score: 5  Tinetti Total Score: 11           Tinetti Tool Score Risk of Falls  <19 = High Fall Risk  19-24 = Moderate Fall Risk  25-28 = Low Fall Risk  Tinetti ME. Performance-Oriented Assessment of Mobility Problems in Elderly Patients. JAGS 1986; 34:119-126. (Scoring Description: PT Bulletin Feb. 10, 1993)    Older adults: (Ulysses et al, 2009; n = 1000 [place] elderly evaluated with ABC, FARRUKH, ADL, and IADL)  · Mean FARRUKH score for males aged 65-79 years = 26.21(3.40)  · Mean FARRUKH score for females age 65-79 years = 25.16(4.30)  · Mean FARRUKH score for males over 80 years = 23.29(6.02)  · Mean FARRUKH score for females over 80 years

## 2025-07-18 NOTE — PROGRESS NOTES
Orders received, chart reviewed. Patient sitting in chair on arrival. Patient defers physical therapy at this time. States his wife is a retired PT. Also states he plans to go home today. Will f/u on Sunday for evaluation if he is still here. Cora Lopez, PT

## 2025-07-18 NOTE — CARE COORDINATION
Care Management Initial Assessment       RUR: n/a obs  Readmission? No  1st IM letter given? No  1st  letter given: No  MOON given 25 0951   Service Assessment   Patient Orientation Alert and Oriented;Person;Place;Situation;Self   Cognition Alert   History Provided By Patient   Primary Caregiver Self   Support Systems Spouse/Significant Other   Patient's Healthcare Decision Maker is: Patient Declined (Legal Next of Kin Remains as Decision Maker)   PCP Verified by CM Yes   Last Visit to PCP   (patient states \"it's been a while\")   Prior Functional Level Independent in ADLs/IADLs   Current Functional Level Assistance with the following:   Can patient return to prior living arrangement Yes   Ability to make needs known: Good   Family able to assist with home care needs: Yes   Would you like for me to discuss the discharge plan with any other family members/significant others, and if so, who? No   Financial Resources Medicare       Patient able to confirm 2 identifiers (name and ) and the following demographics:    Address: [address] [address]    Physical address: [address] [place] [address]     Phone: [phone]    Pharmacy: [facility]    Patient confirmed that his wife, Ama Chaparro at [phone], is his emergency contact. When asked the last time he saw his PCP he stated \"it's been a while.\" Patient currently lives with his wife in a house with 3 steps to enter. He states that he was independent with ADLs until he had to come in the hospital yesterday. For DME he stated he has a wheelchair and a rollator. He does not use home services but said a family friend who is a physical therapist comes by every week to work with him. Patient is adamant that he not receive home services. His wife will transport at discharge. Will continue to follow patient with case management until the time of discharge.    Medicare Outpatient Observation Notice provided to

## 2025-07-18 NOTE — PROGRESS NOTES
Occupational Therapy       Pt recd in bed. Pt reports immediately he \"does not want therapy\". Pt agrees to brief hx interview and reports he resides at home with his wife who is a retired PT. He has two steps to enter and is w/c bound at home and only \"walks\" to pivot to surfaces  (ie toilet) with 2WW. Wife assists with I/ADLs. Pt reports he is going home today no matter what-even if his wife cannot care for him. He does not see any reason to get OOB for therapy and refuses to get OOB. Unable to assess pt's functional status to make further recommendations at this time.    RN and MD made aware.         Ghazala Devlin, OT

## 2025-07-18 NOTE — PLAN OF CARE
Problem: Pain  Goal: Verbalizes/displays adequate comfort level or baseline comfort level  Outcome: Progressing  Flowsheets  Taken 7/18/2025 0532  Verbalizes/displays adequate comfort level or baseline comfort level: Implement non-pharmacological measures as appropriate and evaluate response  Taken 7/18/2025 0030  Verbalizes/displays adequate comfort level or baseline comfort level:   Assess pain using appropriate pain scale   Encourage patient to monitor pain and request assistance  Note: Turn and reposition with pillows, deem light in room,     Problem: Safety - Adult  Goal: Free from fall injury  Outcome: Progressing  Flowsheets (Taken 7/18/2025 0532)  Free From Fall Injury: Based on caregiver fall risk screen, instruct family/caregiver to ask for assistance with transferring infant if caregiver noted to have fall risk factors  Note: Bed alarm on, call light within reach, urinal is within reach.

## 2025-07-18 NOTE — PROGRESS NOTES
Pharmacy Medication Reconciliation     Clarified PTA med list with Home Pharmacy ([facility])    Recommendations/Findings:     The following amendments were made to the patient's active medication list on file at [facility]:     1) Additions:   Apixaban 5 mg BID (filled 7/9)  Bimatoprost 0.01% eye drops.  One drop each eye qPM (filled 6/30)  Brimonidine Tartrate 0.2% - Timolol 0.5% one drop each eye BID (filled 7/12)  Chlorthalidone 25 mg QD (filled 7/9)  Famotidine 40 mg QHS (filled 6/19)  Fluoxetine 20 mg QD (filled 7/2)  Levothyroxine 137 mcg QD (filled 5/8)  Memantine 10 mg QD (filled 6/19)  KCL 20 mEq ER QD (filled 7/2)  Pregabalin 25 mg one tab in AM and two tabs in PM (filled 7/2)  Vibegron 75 mg QD (filled 6/19)    2) Deletions:   Capsaicin  Cobalamin  Duloxetine  Nifedipine  Bupropion  Bumetanide  Escitalopram  Hydralazine  HCTZ  Losartan  zolpidem    PTA medication list was corrected to the following:     Prior to Admission Medications   Prescriptions Last Dose Informant Patient Reported? Taking?   Capsaicin-Menthol-Methyl Sal 0.05-7-20 % OINT   Yes No   Sig: Apply 1 g topically   Cobalamin Combinations (B-12) 100-5000 MCG SUBL   Yes No   Sig: Place 5,000 mcg under the tongue daily   DULoxetine (CYMBALTA) 20 MG extended release capsule   Yes No   Sig: Take 20 mg by mouth daily   NIFEdipine (ADALAT CC) 60 MG extended release tablet   Yes No   Sig: Take 60 mg by mouth daily   amLODIPine (NORVASC) 5 MG tablet   Yes No   Sig: ceived the following from [facility]: Outside name: amLODIPine (NORVASC) 5 mg tablet   buPROPion (WELLBUTRIN XL) 150 MG extended release tablet   Yes No   Sig: ceived the following from [facility]: Outside name: buPROPion XL (WELLBUTRIN XL) 150 mg tablet   bumetanide (BUMEX) 1 MG tablet   Yes No   Sig: Take 1 mg by mouth daily   denosumab (PROLIA) 60 MG/ML SOSY SC injection   Yes No   Sig: Inject 60 mg into the skin   escitalopram (LEXAPRO) 5 MG tablet   Yes No    Sig: Take 10 mg by mouth daily   famotidine (PEPCID) 20 MG tablet   Yes No   Sig: ceived the following from [facility]: Outside name: famotidine (PEPCID) 20 mg tablet   hydrALAZINE (APRESOLINE) 50 MG tablet   Yes No   Sig: ceived the following from [facility]: Outside name: hydrALAZINE (APRESOLINE) 50 mg tablet   hydroCHLOROthiazide (HYDRODIURIL) 25 MG tablet   Yes No   Sig: ceived the following from [facility]: Outside name: hydroCHLOROthiazide (HYDRODIURIL) 25 mg tablet   levothyroxine (SYNTHROID) 75 MCG tablet   Yes No   Sig: Take 125 mcg by mouth every morning (before breakfast)   losartan (COZAAR) 25 MG tablet   Yes No   Sig: Take 25 mg by mouth 2 times daily   meclizine (ANTIVERT) 25 MG tablet   Yes No   Sig: Take 25 mg by mouth 3 times daily as needed   montelukast (SINGULAIR) 10 MG tablet   Yes No   Sig: ceived the following from [facility]: Outside name: montelukast (SINGULAIR) 10 mg tablet   omeprazole (PRILOSEC) 40 MG delayed release capsule   Yes No   Sig: ceived the following from [facility]: Outside name: omeprazole (PRILOSEC) 40 mg capsule   potassium chloride (KLOR-CON) 10 MEQ extended release tablet   Yes No   Sig: ceived the following from [facility]: Outside name: potassium chloride SR (KLOR-CON 10) 10 mEq tablet   vitamin D 25 MCG (1000 UT) CAPS   Yes No   Sig: Take by mouth   zolpidem (AMBIEN) 5 MG tablet   Yes No   Sig: Take 5 mg by mouth nightly.      Facility-Administered Medications: None      Ethan Garvin Jr. Pharm.D.  Clinical Pharmacist

## 2025-07-18 NOTE — PLAN OF CARE
Problem: Chronic Conditions and Co-morbidities  Goal: Patient's chronic conditions and co-morbidity symptoms are monitored and maintained or improved  Outcome: Progressing     Problem: Pain  Goal: Verbalizes/displays adequate comfort level or baseline comfort level  7/18/2025 1440 by Ghazala Butler LPN  Outcome: Progressing  7/18/2025 0532 by Raghavendra Hairston RN  Outcome: Progressing  Flowsheets  Taken 7/18/2025 0532  Verbalizes/displays adequate comfort level or baseline comfort level: Implement non-pharmacological measures as appropriate and evaluate response  Taken 7/18/2025 0030  Verbalizes/displays adequate comfort level or baseline comfort level:   Assess pain using appropriate pain scale   Encourage patient to monitor pain and request assistance  Note: Turn and reposition with pillows, deem light in room,     Problem: Skin/Tissue Integrity  Goal: Skin integrity remains intact  Description: 1.  Monitor for areas of redness and/or skin breakdown  2.  Assess vascular access sites hourly  3.  Every 4-6 hours minimum:  Change oxygen saturation probe site  4.  Every 4-6 hours:  If on nasal continuous positive airway pressure, respiratory therapy assess nares and determine need for appliance change or resting period  Outcome: Progressing     Problem: ABCDS Injury Assessment  Goal: Absence of physical injury  Outcome: Progressing     Problem: Safety - Adult  Goal: Free from fall injury  7/18/2025 1440 by Ghazala Butler LPN  Outcome: Progressing  7/18/2025 0532 by Raghavendra Hairston RN  Outcome: Progressing  Flowsheets (Taken 7/18/2025 0532)  Free From Fall Injury: Based on caregiver fall risk screen, instruct family/caregiver to ask for assistance with transferring infant if caregiver noted to have fall risk factors  Note: Bed alarm on, call light within reach, urinal is within reach.

## 2025-07-18 NOTE — DISCHARGE SUMMARY
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  male with PMHx significant for  for hypertension, hyperlipidemia, history of subarachnoid hemorrhage, normal pressure hydrocephalus s/p  shunt who presented to the ED at the urgency of his wife for worsening gait and ambulatory dysfunction.     When he arrived to the ED vital signs were stable and lab data obtained was unremarkable.  CT scan of the head obtained was negative for acute findings.  He was admitted under the Hospitalist service for further management.    Brief Hospital Course by Main Problems:   On the acute medicine floor patient underwent an MRI scan of the brain which was negative for acute intracranial abnormality and stable positioning of his ventricular shunt catheter.  He additionally underwent an MRI scan of the thoracic spine WO contrast which noted interval mild acute T12 burst fracture with 20% canal stenosis but without an associated disc herniation or epidural hematoma.  Persistent stable dural based mass in the spinal canal at T8-9.  This was discussed with both patient and his wife who requested outpatient follow up and to be discharged home as they wanted to further discuss with his PCP.  Patient was seen by PT and OT with recommendations noted.  At time of discharge patient was medically stable.    Discharge Exam:  Patient seen and examined by me on discharge day.  Pertinent  Findings:  Patient Vitals for the past 24 hrs:   BP Temp Temp src Pulse Resp SpO2   07/18/25 1328 (!) 132/92 -- -- 78 -- 93 %   07/18/25 1100 -- -- -- 76 -- --   07/18/25 0800 -- -- -- 61 -- --   07/18/25 0721 (!) 148/76 98.4 °F (36.9 °C) Oral 63 20 95 %   07/18/25 0432 138/84 97.5 °F (36.4 °C) Oral 63 16 93 %   07/18/25 0400 -- -- Oral 60 -- --   07/18/25 0003 136/80 97.7 °F (36.5 °C) Oral 56 18 94 %   07/18/25 0000 -- -- -- 59 -- --   07/17/25 2033 (!) 144/76 97.2 °F (36.2 °C) Oral 62 16 94 %   07/17/25 2000 -- -- -- 67 -- --   07/17/25 1804 (!) 155/107 -- -- 60 -- --   07/17/25 1731 (!) 152/75 -- -- 63 19 93 %   07/17/25 1716 (!) 157/76 -- -- 63 16 95 %   07/17/25 1701 (!) 132/112 -- -- 61 19 95 %   07/17/25 1646 (!) 148/73 -- -- 62 19 94 %   07/17/25 1631 (!) 148/74 -- -- 61 20 95 %   07/17/25 1616 (!) 147/80 -- -- 61 21 96 %   07/17/25 1559 (!) 152/76 -- -- 60 19 94 %   07/17/25 1547 (!) 133/104 -- -- 59 20 95 %   07/17/25 1533 (!) 151/79 -- -- 60 16 95 %   07/17/25 1527 (!) 143/82 -- -- 62 19 93 %   07/17/25 1445 121/73 -- -- 65 20 94 %       Gen:    Not in distress  Chest: Clear lungs  CVS:   Regular rhythm.  No edema  Abd:  Soft, not distended, not tender  Neuro: No gross deficits    Discharge/Recent Laboratory Results:  Recent Labs     07/17/25  1417 07/18/25  0545    139   K 3.8 3.1*    103   CO2 26 23   BUN 24* 17   CREATININE 1.35* 0.98   GLUCOSE 107* 84   CALCIUM 9.2 8.7   MG 1.8  --      Recent Labs     07/18/25  0545   HGB 16.3   HCT 47.4   WBC 6.9   *       Discharge Medications:     Medication List        CONTINUE taking these medications      amLODIPine 5 MG tablet  Commonly known as: NORVASC     B-12 100-5000 MCG Subl     bumetanide 1 MG tablet  Commonly known as: BUMEX     buPROPion 150 MG extended release tablet  Commonly known as: WELLBUTRIN XL     Capsaicin-Menthol-Methyl Sal 0.05-7-20 % Oint     DULoxetine 20 MG extended release capsule  Commonly known as: CYMBALTA

## 2025-07-21 LAB
EKG ATRIAL RATE: 66 BPM
EKG DIAGNOSIS: NORMAL
EKG P AXIS: 63 DEGREES
EKG P-R INTERVAL: 196 MS
EKG Q-T INTERVAL: 390 MS
EKG QRS DURATION: 86 MS
EKG QTC CALCULATION (BAZETT): 408 MS
EKG R AXIS: -7 DEGREES
EKG T AXIS: -18 DEGREES
EKG VENTRICULAR RATE: 66 BPM